# Patient Record
Sex: FEMALE | Race: WHITE | NOT HISPANIC OR LATINO
[De-identification: names, ages, dates, MRNs, and addresses within clinical notes are randomized per-mention and may not be internally consistent; named-entity substitution may affect disease eponyms.]

---

## 2018-01-11 ENCOUNTER — APPOINTMENT (OUTPATIENT)
Dept: OBGYN | Facility: CLINIC | Age: 58
End: 2018-01-11
Payer: COMMERCIAL

## 2018-01-11 VITALS
HEIGHT: 63.39 IN | BODY MASS INDEX: 24.74 KG/M2 | SYSTOLIC BLOOD PRESSURE: 100 MMHG | DIASTOLIC BLOOD PRESSURE: 60 MMHG | WEIGHT: 141.38 LBS

## 2018-01-11 DIAGNOSIS — Z80.8 FAMILY HISTORY OF MALIGNANT NEOPLASM OF OTHER ORGANS OR SYSTEMS: ICD-10-CM

## 2018-01-11 DIAGNOSIS — Z78.9 OTHER SPECIFIED HEALTH STATUS: ICD-10-CM

## 2018-01-11 DIAGNOSIS — Z82.49 FAMILY HISTORY OF ISCHEMIC HEART DISEASE AND OTHER DISEASES OF THE CIRCULATORY SYSTEM: ICD-10-CM

## 2018-01-11 PROCEDURE — 99203 OFFICE O/P NEW LOW 30 MIN: CPT

## 2018-01-12 ENCOUNTER — TRANSCRIPTION ENCOUNTER (OUTPATIENT)
Age: 58
End: 2018-01-12

## 2018-01-15 LAB — HPV HIGH+LOW RISK DNA PNL CVX: NOT DETECTED

## 2018-01-16 LAB — PAP TEST: NORMAL

## 2018-02-13 ENCOUNTER — APPOINTMENT (OUTPATIENT)
Dept: OBGYN | Facility: CLINIC | Age: 58
End: 2018-02-13

## 2018-02-21 ENCOUNTER — APPOINTMENT (OUTPATIENT)
Dept: GYNECOLOGIC ONCOLOGY | Facility: CLINIC | Age: 58
End: 2018-02-21
Payer: COMMERCIAL

## 2018-02-21 VITALS
BODY MASS INDEX: 24.15 KG/M2 | DIASTOLIC BLOOD PRESSURE: 67 MMHG | SYSTOLIC BLOOD PRESSURE: 114 MMHG | WEIGHT: 138 LBS | HEART RATE: 80 BPM | OXYGEN SATURATION: 97 %

## 2018-02-21 DIAGNOSIS — N95.0 POSTMENOPAUSAL BLEEDING: ICD-10-CM

## 2018-02-21 DIAGNOSIS — K59.00 CONSTIPATION, UNSPECIFIED: ICD-10-CM

## 2018-02-21 PROCEDURE — 99205 OFFICE O/P NEW HI 60 MIN: CPT

## 2018-02-22 PROBLEM — K59.00 CONSTIPATION: Status: ACTIVE | Noted: 2018-01-11

## 2018-02-22 PROBLEM — N95.0 POST-MENOPAUSAL BLEEDING: Status: ACTIVE | Noted: 2018-01-11

## 2018-07-23 PROBLEM — Z80.8 FAMILY HISTORY OF SKIN CANCER: Status: ACTIVE | Noted: 2018-01-11

## 2019-12-06 ENCOUNTER — TRANSCRIPTION ENCOUNTER (OUTPATIENT)
Age: 59
End: 2019-12-06

## 2019-12-13 ENCOUNTER — LABORATORY RESULT (OUTPATIENT)
Age: 59
End: 2019-12-13

## 2019-12-13 ENCOUNTER — APPOINTMENT (OUTPATIENT)
Dept: GYNECOLOGIC ONCOLOGY | Facility: CLINIC | Age: 59
End: 2019-12-13
Payer: COMMERCIAL

## 2019-12-13 VITALS
HEIGHT: 63 IN | DIASTOLIC BLOOD PRESSURE: 86 MMHG | SYSTOLIC BLOOD PRESSURE: 130 MMHG | WEIGHT: 151 LBS | BODY MASS INDEX: 26.75 KG/M2

## 2019-12-13 PROCEDURE — 99396 PREV VISIT EST AGE 40-64: CPT

## 2019-12-13 NOTE — DISCUSSION/SUMMARY
[Discuss Alternatives/Risks/Benefits w/Patient] : All alternatives, risks, and benefits were discussed with the patient/family and all questions were answered.  Patient expressed good understanding and appreciates the importance of follow up as recommended. [Reviewed Clinical Lab Test(s)] : Results of clinical tests were reviewed. [Reviewed Radiology Report(s)] : Radiology reports were reviewed. [FreeTextEntry2] : Small endometrial polyps, asymptomatic in a postmenopausal female:  likely benign [Pre Op] : The differential diagnosis was discussed in detail. The indications, risks, benefits and alternatives were discussed. [unfilled] expressed an understanding of the treatment rationale and her questions were answered to her apparent satisfaction. [FreeTextEntry1] : Endometrial Polyps w/ no abnormal vaginal bleeding \par -repeat her TVUS \par -If unchanged and asymptomatic: continue to monitor\par -If unchanged and symptomatic or larger: D&C hysteroscopy polypectomy\par \par Breast Tenderness\par -Diagnostic Mammogram BL and BL breast US ordered\par -If negative imaging but tenderness persists will refer to Breast clinic \par -Monthly breast exams\par \par Screening\par -Cotesting performed today \par \par Pt notified that she will be notified of results

## 2019-12-13 NOTE — PHYSICAL EXAM
[Normal] : Recto-Vaginal Exam: Normal [de-identified] : Normal Symmetry BL, No palpable mass BL, No lymphadenopathy BL [de-identified] : Non tender [Fully active, able to carry on all pre-disease performance without restriction] : Status 0 - Fully active, able to carry on all pre-disease performance without restriction [de-identified] : No adnexal fullness or palpable mass BL

## 2019-12-13 NOTE — HISTORY OF PRESENT ILLNESS
[FreeTextEntry1] : Problem\par 1)Thickened endometrium\par 2)PMB\par \par Previous Therapy\par 1)US 11/28/17\par    a)Endometrium 8.6mm with two nodular structures, largest 1.0x0.5x1.2cm suspicious for polyps \par    b)R ovary with simple 15mm or 5mm (typo in radiology report)\par 2)Pelvic US 1/10/2018\par    a)Endometrium with two soft tissue nodules, largest 0.8x0.2x0.8cm \par    b)Right ovary with simple 10mm cyst, has decreased in size since 11/2017\par 3)Pelvic US 12/5/2018\par    a) Fluid in the endometrium, single layer endometrial thickness in that area is 1mm\par    b) Right fundal echogenic nodule measuring 0.5 x 0.3 x 0.5 cm, stable and c/w polyp. In the right anterior right endometrium, there is a 0.6 x0.2 x 0.5 cm nodule , stable and c/w polyp \par    c) singe cyst measuring 4mm\par

## 2019-12-13 NOTE — REASON FOR VISIT
[FreeTextEntry1] : 58 y/o here for annual exam. Pt had h/o of PMPB and thickened endometrium  in 2018 and failed EMB. Pelvic sonogram from 1/2018 - 12/5/2018 showed stable polpys and the decision was made to continue to monitor. Pt reports no vaginal bleeding since last visit. Ms. Pena is complaining of Bilateral breast tenderness x 1 month, she is due for mammogram and breast US for h/o dense breast.

## 2019-12-13 NOTE — REVIEW OF SYSTEMS
[Negative] : Musculoskeletal [Abn Vag Bleeding] : no abnormal vaginal bleeding [de-identified] : + Breast tenderness BL x 1 month  [FreeTextEntry4] : Denies any postmenopausal bleeding

## 2019-12-23 DIAGNOSIS — N83.292 OTHER OVARIAN CYST, LEFT SIDE: ICD-10-CM

## 2019-12-23 LAB
A VAGINAE DNA VAG QL NAA+PROBE: NORMAL
BVAB2 DNA VAG QL NAA+PROBE: NORMAL
C KRUSEI DNA VAG QL NAA+PROBE: NEGATIVE
C TRACH RRNA SPEC QL NAA+PROBE: NEGATIVE
HPV HIGH+LOW RISK DNA PNL CVX: DETECTED
MEGA1 DNA VAG QL NAA+PROBE: NORMAL
N GONORRHOEA RRNA SPEC QL NAA+PROBE: NEGATIVE
T VAGINALIS RRNA SPEC QL NAA+PROBE: NEGATIVE

## 2020-01-08 ENCOUNTER — RESULT REVIEW (OUTPATIENT)
Age: 60
End: 2020-01-08

## 2020-01-09 ENCOUNTER — APPOINTMENT (OUTPATIENT)
Dept: SURGERY | Facility: CLINIC | Age: 60
End: 2020-01-09
Payer: COMMERCIAL

## 2020-01-09 VITALS
DIASTOLIC BLOOD PRESSURE: 84 MMHG | TEMPERATURE: 97.6 F | WEIGHT: 151 LBS | SYSTOLIC BLOOD PRESSURE: 114 MMHG | HEART RATE: 100 BPM | BODY MASS INDEX: 26.75 KG/M2 | OXYGEN SATURATION: 98 % | HEIGHT: 63 IN

## 2020-01-09 DIAGNOSIS — Z78.9 OTHER SPECIFIED HEALTH STATUS: ICD-10-CM

## 2020-01-09 PROCEDURE — 99203 OFFICE O/P NEW LOW 30 MIN: CPT

## 2020-01-10 LAB — CANCER AG125 SERPL-ACNC: 7 U/ML

## 2020-01-10 NOTE — HISTORY OF PRESENT ILLNESS
[FreeTextEntry1] : Shirley is a 60 yo female, referred by Tana THAYER) for left breast mastalgia, had been ongoing for the past 6-months. She reports a resolving rash on her chest after taking care of a friends cat (she is allergic), she took Benadryl for 4 days and the rash has almost completely resolved.

## 2020-01-10 NOTE — DATA REVIEWED
[FreeTextEntry1] : --12/19/19 ([facility]) b/l mammogram & US: scattered fibroglandular density. Left breast 12n1 there is a 0.4 cm simple cyst. BI-RADS 2.

## 2020-01-10 NOTE — PAST MEDICAL HISTORY
[Menarche Age ____] : age at menarche was [unfilled] [Postmenopausal] : The patient is postmenopausal [Menopause Age____] : age at menopause was [unfilled] [Approximately ___] : the LMP was approximately [unfilled] [Total Preg ___] : G[unfilled] [History of Hormone Replacement Treatment] : has no history of hormone replacement treatment [FreeTextEntry5] : Uterine polyp removed - 2008 [FreeTextEntry6] : None [FreeTextEntry8] : None [FreeTextEntry7] : Never been on

## 2020-03-10 DIAGNOSIS — N83.291 OTHER OVARIAN CYST, RIGHT SIDE: ICD-10-CM

## 2020-11-06 ENCOUNTER — NON-APPOINTMENT (OUTPATIENT)
Age: 60
End: 2020-11-06

## 2020-11-09 DIAGNOSIS — Z12.31 ENCOUNTER FOR SCREENING MAMMOGRAM FOR MALIGNANT NEOPLASM OF BREAST: ICD-10-CM

## 2020-12-23 PROBLEM — Z12.31 ENCOUNTER FOR SCREENING MAMMOGRAM FOR MALIGNANT NEOPLASM OF BREAST: Status: RESOLVED | Noted: 2020-11-09 | Resolved: 2020-12-23

## 2021-04-22 ENCOUNTER — APPOINTMENT (OUTPATIENT)
Dept: GYNECOLOGIC ONCOLOGY | Facility: CLINIC | Age: 61
End: 2021-04-22

## 2021-05-20 ENCOUNTER — NON-APPOINTMENT (OUTPATIENT)
Age: 61
End: 2021-05-20

## 2021-05-20 ENCOUNTER — APPOINTMENT (OUTPATIENT)
Dept: GYNECOLOGIC ONCOLOGY | Facility: CLINIC | Age: 61
End: 2021-05-20
Payer: COMMERCIAL

## 2021-05-20 VITALS
DIASTOLIC BLOOD PRESSURE: 70 MMHG | TEMPERATURE: 97.7 F | SYSTOLIC BLOOD PRESSURE: 115 MMHG | WEIGHT: 135 LBS | HEIGHT: 63 IN | OXYGEN SATURATION: 95 % | BODY MASS INDEX: 23.92 KG/M2 | HEART RATE: 116 BPM

## 2021-05-20 DIAGNOSIS — Z12.11 ENCOUNTER FOR SCREENING FOR MALIGNANT NEOPLASM OF COLON: ICD-10-CM

## 2021-05-20 DIAGNOSIS — Z12.31 ENCOUNTER FOR SCREENING MAMMOGRAM FOR MALIGNANT NEOPLASM OF BREAST: ICD-10-CM

## 2021-05-20 PROCEDURE — 99396 PREV VISIT EST AGE 40-64: CPT | Mod: 25

## 2021-05-20 NOTE — PHYSICAL EXAM
[Normal] : Recto-Vaginal Exam: Normal [Fully active, able to carry on all pre-disease performance without restriction] : Status 0 - Fully active, able to carry on all pre-disease performance without restriction [de-identified] : soft, non tender [de-identified] : Normal Symmetry BL, No palpable mass BL, No lymphadenopathy BL [de-identified] : Non tender [de-identified] : No adnexal fullness or palpable mass BL

## 2021-05-20 NOTE — DISCUSSION/SUMMARY
[Reviewed Clinical Lab Test(s)] : Results of clinical tests were reviewed. [Reviewed Radiology Report(s)] : Radiology reports were reviewed. [Discuss Alternatives/Risks/Benefits w/Patient] : All alternatives, risks, and benefits were discussed with the patient/family and all questions were answered.  Patient expressed good understanding and appreciates the importance of follow up as recommended. [Pre Op] : The differential diagnosis was discussed in detail. The indications, risks, benefits and alternatives were discussed. [unfilled] expressed an understanding of the treatment rationale and her questions were answered to her apparent satisfaction. [FreeTextEntry2] : Small endometrial polyps, asymptomatic in a postmenopausal female:  likely benign [FreeTextEntry1] : Endometrial Polyps w/ no abnormal vaginal bleeding \par -repeat her TVUS \par -If unchanged and asymptomatic: continue to monitor\par -If unchanged and symptomatic or larger: D&C hysteroscopy polypectomy\par \par Breast Tenderness\par - Mammogram BL and BL breast US ordered\par - Pt has follow up with Dr. Tripp on 6/8\par \par Screening\par -Cotesting performed today \par - Colonscopy referral \par \par Pt notified that she will be notified of results

## 2021-05-20 NOTE — REVIEW OF SYSTEMS
[Negative] : Musculoskeletal [Abn Vag Bleeding] : no abnormal vaginal bleeding [de-identified] : + Breast tenderness to left breast  [FreeTextEntry4] : Denies any postmenopausal bleeding

## 2021-05-20 NOTE — REASON FOR VISIT
[FreeTextEntry1] : 59 y/o here for annual exam, with no acute complainst today. \par \par Pt had h/o of PMPB and thickened endometrium  in 2018 and failed EMB. Pelvic sonogram from 1/2018 - 12/5/2018 showed stable polpys and the decision was made to continue to monitor. Pt reports no vaginal bleeding since last visit. \par \par Patient is being followed by Dr. Tripp for breast tenderness. \par \par Last pap smear: 12/13/2019- neg, hpv positive -- repeat cotesting needed\par Last mammogram: 12/19/2019 - normal \par Last colonoscopy- many years ago as per patient and states h/o polyps in her 40's

## 2021-05-21 LAB — HPV HIGH+LOW RISK DNA PNL CVX: NOT DETECTED

## 2021-06-01 ENCOUNTER — TRANSCRIPTION ENCOUNTER (OUTPATIENT)
Age: 61
End: 2021-06-01

## 2021-06-08 ENCOUNTER — APPOINTMENT (OUTPATIENT)
Dept: BREAST CENTER | Facility: CLINIC | Age: 61
End: 2021-06-08
Payer: COMMERCIAL

## 2021-06-08 VITALS
OXYGEN SATURATION: 99 % | WEIGHT: 137 LBS | BODY MASS INDEX: 24.27 KG/M2 | HEART RATE: 76 BPM | SYSTOLIC BLOOD PRESSURE: 116 MMHG | RESPIRATION RATE: 18 BRPM | HEIGHT: 63 IN | DIASTOLIC BLOOD PRESSURE: 73 MMHG

## 2021-06-08 PROCEDURE — 99212 OFFICE O/P EST SF 10 MIN: CPT

## 2021-07-30 ENCOUNTER — TRANSCRIPTION ENCOUNTER (OUTPATIENT)
Age: 61
End: 2021-07-30

## 2021-07-30 ENCOUNTER — APPOINTMENT (OUTPATIENT)
Dept: GASTROENTEROLOGY | Facility: CLINIC | Age: 61
End: 2021-07-30

## 2021-08-03 ENCOUNTER — NON-APPOINTMENT (OUTPATIENT)
Age: 61
End: 2021-08-03

## 2021-08-03 ENCOUNTER — APPOINTMENT (OUTPATIENT)
Dept: GASTROENTEROLOGY | Facility: CLINIC | Age: 61
End: 2021-08-03
Payer: COMMERCIAL

## 2021-08-03 VITALS
BODY MASS INDEX: 23.92 KG/M2 | HEART RATE: 94 BPM | DIASTOLIC BLOOD PRESSURE: 73 MMHG | TEMPERATURE: 97.8 F | SYSTOLIC BLOOD PRESSURE: 108 MMHG | OXYGEN SATURATION: 97 % | HEIGHT: 63 IN | WEIGHT: 135 LBS

## 2021-08-03 PROCEDURE — 99205 OFFICE O/P NEW HI 60 MIN: CPT

## 2021-08-04 NOTE — PHYSICAL EXAM
[General Appearance - Alert] : alert [General Appearance - In No Acute Distress] : in no acute distress [General Appearance - Well Nourished] : well nourished [Sclera] : the sclera and conjunctiva were normal [Outer Ear] : the ears and nose were normal in appearance [Neck Appearance] : the appearance of the neck was normal [Neck Cervical Mass (___cm)] : no neck mass was observed [] : no respiratory distress [Heart Rate And Rhythm] : heart rate was normal and rhythm regular [Edema] : there was no peripheral edema [Bowel Sounds] : normal bowel sounds [Abdomen Soft] : soft [Abdomen Tenderness] : non-tender [Abnormal Walk] : normal gait [Skin Color & Pigmentation] : normal skin color and pigmentation [No Focal Deficits] : no focal deficits [Oriented To Time, Place, And Person] : oriented to person, place, and time [Impaired Insight] : insight and judgment were intact [Affect] : the affect was normal

## 2021-08-05 NOTE — HISTORY OF PRESENT ILLNESS
[de-identified] : 60F with PMHx hypothyroidism (on Synthroid for 30 years) referred by PA, Tana Duke, for colon cancer screening. \par \par Pt reports last colonoscopy was at least 12 years ago - found to have benign polyps. actually woke up from one colonoscopy which was very traumatic \par in her 30s had a series of duodenal ulcers. many endoscopies. negative celiac. not able to find out what caused it. thinks due to weak peristalsis? antibiotic use? \par a lot of oral surgery with antibiotics when younger\par used to have GI symptoms - but last 10 years much better by conscious of what eats\par recently first time on antibiotics in 15 years. got a bad bite on arm and had a lot of redness so took doxycycline for 1 month\par - checked for lyme disease and one band, # 23, positive\par \par now taking acidophilous 3x per day\par No GI symptoms when on doxy  but kind of worn out from antibiotics and some constipation. still BM every day but notices some straining. hoping will improve\par \par FHX: no GI cancers, no autoimmune diseases\par Etoh: social\par Smoking: none\par Drug use: none\par \par avoid dairy for most part\par eats a lot of nuts, raisins, beans, grains \par spelt flour makes own food \par eats fish once a month

## 2021-08-05 NOTE — ASSESSMENT
[FreeTextEntry1] : 60F with PMHx hypothyroidism (on Synthroid for 30 years) referred by PA, Tana Duke, for colon cancer screening. \par \par Constipation post antibiotic use \par - reassurance provided that symptoms should heal with time and encouraged to incorporate pre/probiotics food \par - stop calcium supplementation for now as can cause constipation \par - start magnesium oxide 500mg at bedtime, psyllium husk/sunfiber \par \par Colon cancer screening \par - schedule colonoscopy for October/November 2021, r/a/i/b discussed and patient agreeable \par - recommended to contact office for earlier colonoscopy appointment with other GI doctor if constipation does not improve\par - bowel prep instructions to be provided \par \par f/u post colonoscopy and PRN

## 2021-08-05 NOTE — REASON FOR VISIT
[Initial Evaluation] : an initial evaluation [FreeTextEntry1] : colon cancer screening  Skin Substitute Injection Text: The defect edges were debeveled with a #15 scalpel blade.  Given the location of the defect, shape of the defect and the proximity to free margins a skin substitute micronized graft was deemed most appropriate.  The entire vial contents were admixed with 3.0ccs of sterile saline and then injected subcutaneously throughout the entire wound bed.

## 2021-09-17 ENCOUNTER — APPOINTMENT (OUTPATIENT)
Dept: INTERNAL MEDICINE | Facility: CLINIC | Age: 61
End: 2021-09-17

## 2021-11-05 ENCOUNTER — APPOINTMENT (OUTPATIENT)
Dept: INTERNAL MEDICINE | Facility: CLINIC | Age: 61
End: 2021-11-05

## 2021-12-08 ENCOUNTER — TRANSCRIPTION ENCOUNTER (OUTPATIENT)
Age: 61
End: 2021-12-08

## 2021-12-13 ENCOUNTER — APPOINTMENT (OUTPATIENT)
Dept: GASTROENTEROLOGY | Facility: CLINIC | Age: 61
End: 2021-12-13
Payer: COMMERCIAL

## 2021-12-13 PROCEDURE — 45378 DIAGNOSTIC COLONOSCOPY: CPT

## 2021-12-16 ENCOUNTER — NON-APPOINTMENT (OUTPATIENT)
Age: 61
End: 2021-12-16

## 2021-12-30 RX ORDER — LEVOTHYROXINE SODIUM 0.1 MG/1
100 TABLET ORAL DAILY
Qty: 60 | Refills: 0 | Status: COMPLETED | COMMUNITY
End: 2021-12-30

## 2022-01-13 ENCOUNTER — APPOINTMENT (OUTPATIENT)
Dept: INTERNAL MEDICINE | Facility: CLINIC | Age: 62
End: 2022-01-13

## 2022-03-02 ENCOUNTER — APPOINTMENT (OUTPATIENT)
Dept: INTERNAL MEDICINE | Facility: CLINIC | Age: 62
End: 2022-03-02
Payer: COMMERCIAL

## 2022-03-02 ENCOUNTER — NON-APPOINTMENT (OUTPATIENT)
Age: 62
End: 2022-03-02

## 2022-03-02 ENCOUNTER — LABORATORY RESULT (OUTPATIENT)
Age: 62
End: 2022-03-02

## 2022-03-02 VITALS
OXYGEN SATURATION: 96 % | HEART RATE: 100 BPM | TEMPERATURE: 97.8 F | WEIGHT: 133 LBS | DIASTOLIC BLOOD PRESSURE: 71 MMHG | BODY MASS INDEX: 23.57 KG/M2 | HEIGHT: 63 IN | SYSTOLIC BLOOD PRESSURE: 105 MMHG

## 2022-03-02 DIAGNOSIS — Z85.820 PERSONAL HISTORY OF MALIGNANT MELANOMA OF SKIN: ICD-10-CM

## 2022-03-02 DIAGNOSIS — Z82.49 FAMILY HISTORY OF ISCHEMIC HEART DISEASE AND OTHER DISEASES OF THE CIRCULATORY SYSTEM: ICD-10-CM

## 2022-03-02 DIAGNOSIS — Z13.1 ENCOUNTER FOR SCREENING FOR DIABETES MELLITUS: ICD-10-CM

## 2022-03-02 PROCEDURE — 99386 PREV VISIT NEW AGE 40-64: CPT | Mod: 25

## 2022-03-02 RX ORDER — MAGNESIUM OXIDE 500 MG
500 TABLET ORAL
Refills: 0 | Status: ACTIVE | COMMUNITY

## 2022-03-02 NOTE — HISTORY OF PRESENT ILLNESS
[FreeTextEntry1] : annual exam/est care [de-identified] : h/o melanoma\par - follows w/ derm\par - has had 2 lesions removed, 1 melanoma \par \par hypothyroidism\par - stable on Synthroid, needs brand\par \par h/o tick bite, 7/2021\par - w/ rash over left arm\par - started doxy x 4 weeks\par \par HCM\par - c scope up today \par - needs dexa\par - declines flu vax

## 2022-03-10 ENCOUNTER — NON-APPOINTMENT (OUTPATIENT)
Age: 62
End: 2022-03-10

## 2022-03-10 ENCOUNTER — TRANSCRIPTION ENCOUNTER (OUTPATIENT)
Age: 62
End: 2022-03-10

## 2022-03-10 ENCOUNTER — APPOINTMENT (OUTPATIENT)
Dept: OPHTHALMOLOGY | Facility: CLINIC | Age: 62
End: 2022-03-10
Payer: COMMERCIAL

## 2022-03-10 LAB
ALBUMIN SERPL ELPH-MCNC: 4.9 G/DL
ALP BLD-CCNC: 88 U/L
ALT SERPL-CCNC: 22 U/L
ANION GAP SERPL CALC-SCNC: 14 MMOL/L
APPEARANCE: ABNORMAL
AST SERPL-CCNC: 31 U/L
BASOPHILS # BLD AUTO: 0.04 K/UL
BASOPHILS NFR BLD AUTO: 0.7 %
BILIRUB SERPL-MCNC: 0.8 MG/DL
BILIRUBIN URINE: NEGATIVE
BLOOD URINE: NEGATIVE
BUN SERPL-MCNC: 13 MG/DL
CALCIUM SERPL-MCNC: 10.1 MG/DL
CHLORIDE SERPL-SCNC: 101 MMOL/L
CHOLEST SERPL-MCNC: 277 MG/DL
CO2 SERPL-SCNC: 25 MMOL/L
COLOR: ABNORMAL
CREAT SERPL-MCNC: 0.72 MG/DL
EGFR: 96 ML/MIN/1.73M2
EOSINOPHIL # BLD AUTO: 0.07 K/UL
EOSINOPHIL NFR BLD AUTO: 1.2 %
ESTIMATED AVERAGE GLUCOSE: 103 MG/DL
GLUCOSE QUALITATIVE U: NEGATIVE
GLUCOSE SERPL-MCNC: 77 MG/DL
HBA1C MFR BLD HPLC: 5.2 %
HCT VFR BLD CALC: 40.3 %
HDLC SERPL-MCNC: 81 MG/DL
HGB BLD-MCNC: 13.2 G/DL
IMM GRANULOCYTES NFR BLD AUTO: 0.2 %
KETONES URINE: ABNORMAL
LDLC SERPL CALC-MCNC: 178 MG/DL
LEUKOCYTE ESTERASE URINE: NEGATIVE
LYMPHOCYTES # BLD AUTO: 2.18 K/UL
LYMPHOCYTES NFR BLD AUTO: 36.6 %
MAN DIFF?: NORMAL
MCHC RBC-ENTMCNC: 29.5 PG
MCHC RBC-ENTMCNC: 32.8 GM/DL
MCV RBC AUTO: 90 FL
MONOCYTES # BLD AUTO: 0.39 K/UL
MONOCYTES NFR BLD AUTO: 6.6 %
NEUTROPHILS # BLD AUTO: 3.26 K/UL
NEUTROPHILS NFR BLD AUTO: 54.7 %
NITRITE URINE: NEGATIVE
NONHDLC SERPL-MCNC: 195 MG/DL
PH URINE: 5.5
PLATELET # BLD AUTO: 222 K/UL
POTASSIUM SERPL-SCNC: 4.3 MMOL/L
PROT SERPL-MCNC: 7.8 G/DL
PROTEIN URINE: ABNORMAL
RBC # BLD: 4.48 M/UL
RBC # FLD: 13.3 %
SODIUM SERPL-SCNC: 140 MMOL/L
SPECIFIC GRAVITY URINE: 1.03
TRIGL SERPL-MCNC: 86 MG/DL
TSH SERPL-ACNC: 1.82 UIU/ML
UROBILINOGEN URINE: ABNORMAL
WBC # FLD AUTO: 5.95 K/UL

## 2022-03-10 PROCEDURE — 92004 COMPRE OPH EXAM NEW PT 1/>: CPT

## 2022-03-10 PROCEDURE — 92134 CPTRZ OPH DX IMG PST SGM RTA: CPT

## 2022-03-11 ENCOUNTER — NON-APPOINTMENT (OUTPATIENT)
Age: 62
End: 2022-03-11

## 2022-03-11 ENCOUNTER — APPOINTMENT (OUTPATIENT)
Dept: HEART AND VASCULAR | Facility: CLINIC | Age: 62
End: 2022-03-11
Payer: COMMERCIAL

## 2022-03-11 VITALS
BODY MASS INDEX: 23.57 KG/M2 | WEIGHT: 133 LBS | OXYGEN SATURATION: 98 % | DIASTOLIC BLOOD PRESSURE: 71 MMHG | TEMPERATURE: 97.8 F | HEART RATE: 70 BPM | HEIGHT: 63 IN | SYSTOLIC BLOOD PRESSURE: 113 MMHG

## 2022-03-11 PROCEDURE — 36415 COLL VENOUS BLD VENIPUNCTURE: CPT

## 2022-03-11 PROCEDURE — 93000 ELECTROCARDIOGRAM COMPLETE: CPT

## 2022-03-11 PROCEDURE — 99204 OFFICE O/P NEW MOD 45 MIN: CPT | Mod: 25

## 2022-03-12 NOTE — PHYSICAL EXAM
[Normal Conjunctiva] : normal conjunctiva [Normal Venous Pressure] : normal venous pressure [No Carotid Bruit] : no carotid bruit [Normal S1, S2] : normal S1, S2 [No Murmur] : no murmur [No Rub] : no rub [Normal] : soft, non-tender, no masses/organomegaly, normal bowel sounds [Normal Gait] : normal gait [No Edema] : no edema [Moves all extremities] : moves all extremities [Alert and Oriented] : alert and oriented

## 2022-03-12 NOTE — HISTORY OF PRESENT ILLNESS
[FreeTextEntry1] : Ms. Pena is a 61 year old woman with HL (declines statins), hx of melanoma, hypothyroidism who is referred for palpitations. \par \par Last saw cardiologist 4-5 years ago, had a stress test, Holter -- everything was fine\par \par Palpitations:\par -heart pounding when trying to go to sleep\par -thinks related to coffee -- if not drinking coffee, thinks can fall asleep without the pounding sensation\par \par Reports dyspnea on exertion \par No chest pain\par When walking 6-7 miles, has leg heaviness -- used to be better with swimming\par She is concerned because of her family history \par \par HL:\par -diagnosed in her late teens\par -diagnosed with hypothyroidism in her late 20s -- felt much better after treatment\par -has been resistant to statins\par -reports LDL has been variable over the years but elevated on most recent labs\par \par Recent labs:\par Chol 277\par Trig 86\par \par HDL 81\par TSH 1.82\par Cr 0.72\par AST/ALT \par A1c 5.2%\par \par PMH/PSH:\par as above; also\par arthroscopy\par oral surgeries\par \par FH\par father  at age 60 of [place]\par \par SH\par no tob\par occ etoh\par \par \par ALL\par sulfa\par milk\par \par ROS\par no fever/chills\par no nausea/vomiting\par \par Lifestyle History:\par Mediterranean Diet Score (9 question survey) was 6-7\par (8-9: optimal, 6-7: near-optimal, 4-5: suboptimal, 0-3: markedly suboptimal)\par Exercise: Patient reports exercising at a moderate level for 30-60 minutes per week. \par Smoking: Never smoker\par Denies snoring, rare gasping episodes in sleep, no excessive daytime fatigue\par \par No PCOS\par No pregnancies\par No miscarriages\par Menopause age 54 no HRT

## 2022-03-12 NOTE — DISCUSSION/SUMMARY
[FreeTextEntry1] : Ms. Pena is a 61 year old woman with HL (declines statins), FH of CAD, hx of melanoma, hypothyroidism who is referred for palpitations and c/o dyspnea on exertion.\par \par Palpitations\par -discussed likely related to caffeine but since thinks symptoms are getting worse, will check ambulatory monitor\par -check TTE \par \par Dyspnea on exertion:\par -check TTE\par -given FH and risk factor of long-standing HL, check CCTA (HR on EKG 50s so will hold off on metop premed)\par \par HL:\par -discussed statin initiation however she is hesitant -- wants to wait for CCTA results\par -already following near-optimal diet\par -given FH of early CAD, check lipoprotein (a) -- pt agreeable\par \par Follow-up after above testing\par

## 2022-03-15 LAB — APO LP(A) SERPL-MCNC: <9 NMOL/L

## 2022-03-28 ENCOUNTER — NON-APPOINTMENT (OUTPATIENT)
Age: 62
End: 2022-03-28

## 2022-04-06 ENCOUNTER — APPOINTMENT (OUTPATIENT)
Dept: HEART AND VASCULAR | Facility: CLINIC | Age: 62
End: 2022-04-06

## 2022-07-14 ENCOUNTER — OUTPATIENT (OUTPATIENT)
Dept: OUTPATIENT SERVICES | Facility: HOSPITAL | Age: 62
LOS: 1 days | End: 2022-07-14

## 2022-07-14 ENCOUNTER — APPOINTMENT (OUTPATIENT)
Dept: CT IMAGING | Facility: CLINIC | Age: 62
End: 2022-07-14

## 2022-07-14 ENCOUNTER — RESULT REVIEW (OUTPATIENT)
Age: 62
End: 2022-07-14

## 2022-07-14 PROCEDURE — 75574 CT ANGIO HRT W/3D IMAGE: CPT | Mod: 26

## 2022-07-15 ENCOUNTER — RESULT REVIEW (OUTPATIENT)
Age: 62
End: 2022-07-15

## 2022-07-15 ENCOUNTER — APPOINTMENT (OUTPATIENT)
Dept: ULTRASOUND IMAGING | Facility: CLINIC | Age: 62
End: 2022-07-15

## 2022-07-15 ENCOUNTER — APPOINTMENT (OUTPATIENT)
Dept: MAMMOGRAPHY | Facility: CLINIC | Age: 62
End: 2022-07-15

## 2022-07-15 PROCEDURE — 76856 US EXAM PELVIC COMPLETE: CPT

## 2022-07-15 PROCEDURE — 76830 TRANSVAGINAL US NON-OB: CPT

## 2022-07-15 PROCEDURE — 77067 SCR MAMMO BI INCL CAD: CPT

## 2022-07-15 PROCEDURE — 77063 BREAST TOMOSYNTHESIS BI: CPT

## 2022-07-18 DIAGNOSIS — R06.00 DYSPNEA, UNSPECIFIED: ICD-10-CM

## 2022-07-21 ENCOUNTER — APPOINTMENT (OUTPATIENT)
Dept: GYNECOLOGIC ONCOLOGY | Facility: CLINIC | Age: 62
End: 2022-07-21

## 2022-07-21 VITALS
TEMPERATURE: 97.3 F | HEIGHT: 63 IN | SYSTOLIC BLOOD PRESSURE: 110 MMHG | HEART RATE: 108 BPM | WEIGHT: 131 LBS | DIASTOLIC BLOOD PRESSURE: 64 MMHG | BODY MASS INDEX: 23.21 KG/M2 | OXYGEN SATURATION: 98 %

## 2022-07-21 PROCEDURE — 99396 PREV VISIT EST AGE 40-64: CPT

## 2022-07-21 NOTE — HISTORY OF PRESENT ILLNESS
[FreeTextEntry1] : Problem\par 1)Thickened endometrium\par 2)PMB\par \par Previous Therapy\par 1)US 11/28/17\par    a)Endometrium 8.6mm with two nodular structures, largest 1.0x0.5x1.2cm suspicious for polyps \par    b)R ovary with simple 15mm or 5mm (typo in radiology report)\par 2)Pelvic US 1/10/2018\par    a)Endometrium with two soft tissue nodules, largest 0.8x0.2x0.8cm \par    b)Right ovary with simple 10mm cyst, has decreased in size since 11/2017\par 3)Pelvic US 12/5/2018\par    a) Fluid in the endometrium, single layer endometrial thickness in that area is 1mm\par    b) Right fundal echogenic nodule measuring 0.5 x 0.3 x 0.5 cm, stable and c/w polyp. In the right anterior right endometrium, there is a 0.6 x0.2 x 0.5 cm nodule , stable and c/w polyp \par    c) singe cyst measuring 4mm\par 4) Pelvic US 11/2021- two endometrial polyps- measuring 0.9cm and 0.5cm \par 5) Pelvic US 7/022\par    a)  stable endometrial polyps 0.8cm and 0.3cm. Probable endometrial adhesion.Normal ovaries.\par

## 2022-07-21 NOTE — DISCUSSION/SUMMARY
[Reviewed Clinical Lab Test(s)] : Results of clinical tests were reviewed. [Reviewed Radiology Report(s)] : Radiology reports were reviewed. [Discuss Alternatives/Risks/Benefits w/Patient] : All alternatives, risks, and benefits were discussed with the patient/family and all questions were answered.  Patient expressed good understanding and appreciates the importance of follow up as recommended. [Pre Op] : The differential diagnosis was discussed in detail. The indications, risks, benefits and alternatives were discussed. [unfilled] expressed an understanding of the treatment rationale and her questions were answered to her apparent satisfaction. [FreeTextEntry2] : Small endometrial polyps, asymptomatic in a postmenopausal female:  likely benign [FreeTextEntry1] : - Unremarkbale GYN exam\par - Stable/slight decreased endometrial polyps\par - Repeat sonogram in 6 months as per patient's request\par - Follow up in office in 1 year or prn. Pt encouraged to come in sooner if she experiences any postmenopausal bleeding. Pt verbalized understanding.

## 2022-07-21 NOTE — REVIEW OF SYSTEMS
[Negative] : Musculoskeletal [Abn Vag Bleeding] : no abnormal vaginal bleeding [de-identified] : + Breast tenderness to left breast  [FreeTextEntry4] : Denies any postmenopausal bleeding

## 2022-07-21 NOTE — REASON FOR VISIT
[FreeTextEntry1] : 60 y/o here for annual exam, with no acute complaints today. \par \par Pt had h/o of PMPB and thickened endometrium  in 2018 and failed EMB. Pelvic sonogram from 1/2018 - 12/5/2018 showed stable polpys and the decision was made to continue to monitor. Pt reports no vaginal bleeding since last visit. \par \par Pt reports previous breast tenderness has resolved. \par \par Last pap smear: 5/2021- neg, HPV neg \par Last mammogram:  7/2022- [facility]IR[facility] 1- negative \par Last colonoscopy- many years ago as per patient and states h/o polyps in her 40's

## 2022-07-21 NOTE — PHYSICAL EXAM
[Normal] : Recto-Vaginal Exam: Normal [Fully active, able to carry on all pre-disease performance without restriction] : Status 0 - Fully active, able to carry on all pre-disease performance without restriction [de-identified] : soft, non tender [de-identified] : Normal Symmetry BL, No palpable mass BL, No lymphadenopathy BL [de-identified] : Non tender [de-identified] : No adnexal fullness or palpable mass BL

## 2022-07-27 ENCOUNTER — APPOINTMENT (OUTPATIENT)
Dept: HEART AND VASCULAR | Facility: CLINIC | Age: 62
End: 2022-07-27

## 2022-07-27 PROCEDURE — 99443: CPT

## 2022-07-28 RX ORDER — OFLOXACIN 3 MG/ML
0.3 SOLUTION/ DROPS OPHTHALMIC
Qty: 5 | Refills: 0 | Status: COMPLETED | COMMUNITY
Start: 2022-04-12

## 2022-07-28 RX ORDER — ERYTHROMYCIN 5 MG/G
5 OINTMENT OPHTHALMIC
Qty: 4 | Refills: 0 | Status: COMPLETED | COMMUNITY
Start: 2022-04-12

## 2022-07-29 ENCOUNTER — APPOINTMENT (OUTPATIENT)
Dept: HEART AND VASCULAR | Facility: CLINIC | Age: 62
End: 2022-07-29

## 2022-07-29 VITALS
HEART RATE: 91 BPM | WEIGHT: 131 LBS | DIASTOLIC BLOOD PRESSURE: 76 MMHG | OXYGEN SATURATION: 95 % | SYSTOLIC BLOOD PRESSURE: 113 MMHG | BODY MASS INDEX: 23.21 KG/M2 | HEIGHT: 63 IN

## 2022-07-29 PROCEDURE — 93306 TTE W/DOPPLER COMPLETE: CPT

## 2022-08-01 ENCOUNTER — NON-APPOINTMENT (OUTPATIENT)
Age: 62
End: 2022-08-01

## 2022-09-23 DIAGNOSIS — Z23 ENCOUNTER FOR IMMUNIZATION: ICD-10-CM

## 2022-09-26 ENCOUNTER — TRANSCRIPTION ENCOUNTER (OUTPATIENT)
Age: 62
End: 2022-09-26

## 2022-11-08 ENCOUNTER — APPOINTMENT (OUTPATIENT)
Dept: ULTRASOUND IMAGING | Facility: CLINIC | Age: 62
End: 2022-11-08

## 2023-03-20 ENCOUNTER — RX RENEWAL (OUTPATIENT)
Age: 63
End: 2023-03-20

## 2023-04-02 ENCOUNTER — RESULT CHARGE (OUTPATIENT)
Age: 63
End: 2023-04-02

## 2023-04-03 ENCOUNTER — APPOINTMENT (OUTPATIENT)
Dept: INTERNAL MEDICINE | Facility: CLINIC | Age: 63
End: 2023-04-03
Payer: COMMERCIAL

## 2023-04-03 ENCOUNTER — NON-APPOINTMENT (OUTPATIENT)
Age: 63
End: 2023-04-03

## 2023-04-03 ENCOUNTER — LABORATORY RESULT (OUTPATIENT)
Age: 63
End: 2023-04-03

## 2023-04-03 VITALS
HEART RATE: 80 BPM | WEIGHT: 129 LBS | RESPIRATION RATE: 18 BRPM | TEMPERATURE: 98.3 F | HEIGHT: 63 IN | BODY MASS INDEX: 22.86 KG/M2 | DIASTOLIC BLOOD PRESSURE: 69 MMHG | SYSTOLIC BLOOD PRESSURE: 112 MMHG | OXYGEN SATURATION: 99 %

## 2023-04-03 DIAGNOSIS — R00.2 PALPITATIONS: ICD-10-CM

## 2023-04-03 DIAGNOSIS — Z00.00 ENCOUNTER FOR GENERAL ADULT MEDICAL EXAMINATION W/OUT ABNORMAL FINDINGS: ICD-10-CM

## 2023-04-03 DIAGNOSIS — Z53.20 PROCEDURE AND TREATMENT NOT CARRIED OUT BECAUSE OF PATIENT'S DECISION FOR UNSPECIFIED REASONS: ICD-10-CM

## 2023-04-03 DIAGNOSIS — U07.1 COVID-19: ICD-10-CM

## 2023-04-03 DIAGNOSIS — M85.80 OTHER SPECIFIED DISORDERS OF BONE DENSITY AND STRUCTURE, UNSPECIFIED SITE: ICD-10-CM

## 2023-04-03 DIAGNOSIS — N64.4 MASTODYNIA: ICD-10-CM
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
## 2023-04-03 DIAGNOSIS — R25.2 CRAMP AND SPASM: ICD-10-CM

## 2023-04-03 DIAGNOSIS — R33.9 RETENTION OF URINE, UNSPECIFIED: ICD-10-CM

## 2023-04-03 DIAGNOSIS — J10.1 INFLUENZA DUE TO OTHER IDENTIFIED INFLUENZA VIRUS WITH OTHER RESPIRATORY MANIFESTATIONS: ICD-10-CM

## 2023-04-03 PROCEDURE — 93000 ELECTROCARDIOGRAM COMPLETE: CPT

## 2023-04-03 PROCEDURE — 99396 PREV VISIT EST AGE 40-64: CPT | Mod: 25

## 2023-04-03 PROCEDURE — 99214 OFFICE O/P EST MOD 30 MIN: CPT | Mod: 25

## 2023-04-03 RX ORDER — METOPROLOL SUCCINATE 50 MG/1
50 TABLET, EXTENDED RELEASE ORAL
Qty: 2 | Refills: 0 | Status: COMPLETED | COMMUNITY
Start: 2022-05-19 | End: 2023-04-03

## 2023-04-03 NOTE — HISTORY OF PRESENT ILLNESS
[FreeTextEntry1] : annual physical [de-identified] : Pt is a 61 y/o F with PMHx of palpitations, osteopenia who presents to the office today for annual physical\par \par L breast pain:\par - Intense pain, dull, when she palpates it sharp.  can radiates. ? nipple changes,  Occurring 3 months\par - Had previously saw Dr. Way - breast specialist, because of abnormal finding on mammogram.  Last visit May 2020\par - has become more frequent recently \par \par ENT:\par - is interested in referral because of History of radiation due to multiple oral surgeries \par - need referral\par \par HCM\par - Covid vaccine: bivalent UTD\par - Influenza vaccine: did not get vaccine\par - Shingrix vaccine: Needs \par - Colonoscopy: due 2026 years- suboptimal prep\par - Mammo: 7/22 WNL\par - Pap: 5/21 WNL\par - DEXA: osteopenia 3/22\par - Ophthalmology: UTD\par - [place]  [place]

## 2023-04-03 NOTE — HEALTH RISK ASSESSMENT
[Patient reported mammogram was normal] : Patient reported mammogram was normal [Patient reported PAP Smear was normal] : Patient reported PAP Smear was normal [Patient reported bone density results were abnormal] : Patient reported bone density results were abnormal [HIV test declined] : HIV test declined [Hepatitis C test declined] : Hepatitis C test declined [No falls in past year] : Patient reported no falls in the past year [0] : 2) Feeling down, depressed, or hopeless: Not at all (0) [PHQ-2 Negative - No further assessment needed] : PHQ-2 Negative - No further assessment needed [Patient reported colonoscopy was normal] : Patient reported colonoscopy was normal [Never] : Never [[id number]] : 0 [Change in mental status noted] : No change in mental status noted [MammogramDate] : 7/2022 [PapSmearDate] : 5/21 [BoneDensityDate] : 3/22 [BoneDensityComments] : Osteopenia [ColonoscopyDate] : 12/21 [ColonoscopyComments] : due 2026 years- suboptimal prep

## 2023-04-03 NOTE — PHYSICAL EXAM
[No Acute Distress] : no acute distress [No Edema] : there was no peripheral edema [No CVA Tenderness] : no CVA  tenderness [No Rash] : no rash [Normal Appearance] : normal in appearance [No Masses] : no palpable masses [No Nipple Discharge] : no nipple discharge [No Axillary Lymphadenopathy] : no axillary lymphadenopathy [Normal] : no posterior cervical lymphadenopathy and no anterior cervical lymphadenopathy [de-identified] : tenderness L breast 10 O'clock position 3 cm from nipple

## 2023-04-04 DIAGNOSIS — N39.0 URINARY TRACT INFECTION, SITE NOT SPECIFIED: ICD-10-CM

## 2023-04-04 LAB
ALBUMIN SERPL ELPH-MCNC: 4.6 G/DL
ALP BLD-CCNC: 69 U/L
ALT SERPL-CCNC: 16 U/L
ANION GAP SERPL CALC-SCNC: 12 MMOL/L
APPEARANCE: ABNORMAL
AST SERPL-CCNC: 27 U/L
BASOPHILS # BLD AUTO: 0.04 K/UL
BASOPHILS NFR BLD AUTO: 0.6 %
BILIRUB SERPL-MCNC: 0.6 MG/DL
BILIRUBIN URINE: NEGATIVE
BLOOD URINE: NEGATIVE
BUN SERPL-MCNC: 9 MG/DL
CALCIUM SERPL-MCNC: 9.9 MG/DL
CHLORIDE SERPL-SCNC: 102 MMOL/L
CHOLEST SERPL-MCNC: 231 MG/DL
CO2 SERPL-SCNC: 27 MMOL/L
COLOR: YELLOW
CREAT SERPL-MCNC: 0.61 MG/DL
EGFR: 102 ML/MIN/1.73M2
EOSINOPHIL # BLD AUTO: 0.07 K/UL
EOSINOPHIL NFR BLD AUTO: 1.1 %
ESTIMATED AVERAGE GLUCOSE: 108 MG/DL
GLUCOSE QUALITATIVE U: NEGATIVE
GLUCOSE SERPL-MCNC: 95 MG/DL
HBA1C MFR BLD HPLC: 5.4 %
HCT VFR BLD CALC: 37.8 %
HDLC SERPL-MCNC: 79 MG/DL
HGB BLD-MCNC: 12.3 G/DL
IMM GRANULOCYTES NFR BLD AUTO: 0.2 %
KETONES URINE: NEGATIVE
LDLC SERPL CALC-MCNC: 139 MG/DL
LEUKOCYTE ESTERASE URINE: ABNORMAL
LYMPHOCYTES # BLD AUTO: 2.27 K/UL
LYMPHOCYTES NFR BLD AUTO: 35 %
MAGNESIUM SERPL-MCNC: 2.2 MG/DL
MAN DIFF?: NORMAL
MCHC RBC-ENTMCNC: 30.1 PG
MCHC RBC-ENTMCNC: 32.5 GM/DL
MCV RBC AUTO: 92.4 FL
MONOCYTES # BLD AUTO: 0.48 K/UL
MONOCYTES NFR BLD AUTO: 7.4 %
NEUTROPHILS # BLD AUTO: 3.62 K/UL
NEUTROPHILS NFR BLD AUTO: 55.7 %
NITRITE URINE: NEGATIVE
NONHDLC SERPL-MCNC: 152 MG/DL
PH URINE: 8
PHOSPHATE SERPL-MCNC: 3.9 MG/DL
PLATELET # BLD AUTO: 265 K/UL
POTASSIUM SERPL-SCNC: 4.7 MMOL/L
PROT SERPL-MCNC: 7.2 G/DL
PROTEIN URINE: ABNORMAL
RBC # BLD: 4.09 M/UL
RBC # FLD: 14.1 %
SODIUM SERPL-SCNC: 140 MMOL/L
SPECIFIC GRAVITY URINE: 1.02
TRIGL SERPL-MCNC: 63 MG/DL
TSH SERPL-ACNC: 2.21 UIU/ML
UROBILINOGEN URINE: NORMAL
WBC # FLD AUTO: 6.49 K/UL

## 2023-04-07 ENCOUNTER — NON-APPOINTMENT (OUTPATIENT)
Age: 63
End: 2023-04-07

## 2023-04-07 ENCOUNTER — APPOINTMENT (OUTPATIENT)
Dept: HEART AND VASCULAR | Facility: CLINIC | Age: 63
End: 2023-04-07
Payer: COMMERCIAL

## 2023-04-07 VITALS
DIASTOLIC BLOOD PRESSURE: 64 MMHG | SYSTOLIC BLOOD PRESSURE: 104 MMHG | OXYGEN SATURATION: 98 % | WEIGHT: 128 LBS | TEMPERATURE: 97.2 F | HEIGHT: 63 IN | BODY MASS INDEX: 22.68 KG/M2 | HEART RATE: 89 BPM

## 2023-04-07 DIAGNOSIS — R07.9 CHEST PAIN, UNSPECIFIED: ICD-10-CM

## 2023-04-07 PROCEDURE — 99214 OFFICE O/P EST MOD 30 MIN: CPT

## 2023-04-12 NOTE — CARDIOLOGY SUMMARY
[de-identified] : \par July 2022 TTE: EF 55%, no sig valve disease, no pericardial effusion [de-identified] : \par 3/11/22 EKG: sinus bradycardia at 56bpm\par April 2023 EKG reviewed SR, baseline artifact [de-identified] : \par July 2022 CCTA: significant motion artifact but CAC 0, distal RPDA nwv, remaining segmens nwv

## 2023-04-12 NOTE — HISTORY OF PRESENT ILLNESS
[FreeTextEntry1] : Shirley Pena is a 62 year old woman with HL (has declined statins), hx of melanoma, hypothyroidism who was referred for palpitations and is seen for follow-up. \par \par Last seen 2022. At that time, ordered for amb monitor, TTE, CCTA, Lp(a). Was hesitant to statin. \par \par Since then:\par -CCTA normal cors, Lp(a) wnl, statin deferred given low 10yr ascvd risk - discussion of genetic testing\par - in 2023, down from 178 in 2022\par - No further changes in medications\par \par Reports symptoms of discomfort at the 3-4 ICS of the L chest that is reproducible to palpation. States has noticed certain sleeping positions that help reduce discomfort. Not exacerbated by exertion/exercise. Denies associated SOB, no N/V. No dizziness/fatigue.\par \par HL:\par -diagnosed in her late teens\par -diagnosed with hypothyroidism in her late 20s -- felt much better after treatment\par -has been resistant to statins\par -reports LDL has been variable over the years but elevated on most recent labs\par \par PMH/PSH:\par as above; also\par arthroscopy\par oral surgeries\par \par FH\par father  at age 60 of [place]\par \par SH\par no tob\par occ etoh\par \par \par ALL\par sulfa\par milk\par \par ROS\par no fever/chills\par no nausea/vomiting\par \par Lifestyle History:\par Mediterranean Diet Score (9 question survey) was 6-7\par (8-9: optimal, 6-7: near-optimal, 4-5: suboptimal, 0-3: markedly suboptimal)\par Exercise: Patient reports exercising at a moderate level for 30-60 minutes per week. \par Smoking: Never smoker\par Denies snoring, rare gasping episodes in sleep, no excessive daytime fatigue\par \par No PCOS\par No pregnancies\par No miscarriages\par Menopause age 54 no HRT

## 2023-04-12 NOTE — END OF VISIT
[] : Fellow [FreeTextEntry3] : Patient seen and examined. Case discussed with cardiology fellow. Agree with plan as detailed above.

## 2023-04-12 NOTE — ASSESSMENT
[FreeTextEntry1] : Shirley Pena is a 62 year old woman with HL (declines statins), hx of melanoma, hypothyroidism who was referred for palpitations and is seen for follow-up. \par \par 1) HLD\par -TChol 231 (from 277) (from 178) on most recent labs (4/2023)\par - CCTA no calcium, no significant disease but limited 2/2 to motion artifact\par - TSH WNL, on synthroid\par - Other ASCVD risk factors are optimized: BP at goal on no medications, A1c WNL\par - Discussed utility of genetic testing for FH to further risk stratify need for statin; given young age at diagnosis and family history, reasonable to check genetic testing as pt would be more amenable to statin if positive\par \par 2) Chest wall discomfort\par Atypical pain at L 3-4ICS tenderness that is reproducible with palpation. Pending ultrasound with PCP, likely MSK etiology, given worsening symptoms, will pursue stress echocardiogram as CCTA was limited by motion artifact and pt does have family history\par \par Follow up pending stress echo and testing.

## 2023-04-12 NOTE — PHYSICAL EXAM
[Well Developed] : well developed [Well Nourished] : well nourished [Normal Conjunctiva] : normal conjunctiva [Normal Venous Pressure] : normal venous pressure [Normal S1, S2] : normal S1, S2 [No Murmur] : no murmur [Clear Lung Fields] : clear lung fields [Good Air Entry] : good air entry [No Respiratory Distress] : no respiratory distress  [Soft] : abdomen soft [Non Tender] : non-tender [Normal Gait] : normal gait [No Edema] : no edema [Moves all extremities] : moves all extremities [Alert and Oriented] : alert and oriented [de-identified] : DP pulses palpable b/l

## 2023-04-12 NOTE — REVIEW OF SYSTEMS
[Negative] : Neurological [Fever] : no fever [Headache] : no headache [SOB] : no shortness of breath [Dyspnea on exertion] : not dyspnea during exertion [Orthopnea] : no orthopnea [PND] : no PND [FreeTextEntry5] : Chest wall discomfort, occassional palpitation

## 2023-04-14 ENCOUNTER — APPOINTMENT (OUTPATIENT)
Dept: HEART AND VASCULAR | Facility: CLINIC | Age: 63
End: 2023-04-14

## 2023-05-19 ENCOUNTER — APPOINTMENT (OUTPATIENT)
Dept: HEART AND VASCULAR | Facility: CLINIC | Age: 63
End: 2023-05-19
Payer: COMMERCIAL

## 2023-05-19 VITALS
SYSTOLIC BLOOD PRESSURE: 99 MMHG | HEART RATE: 75 BPM | WEIGHT: 128 LBS | BODY MASS INDEX: 22.68 KG/M2 | DIASTOLIC BLOOD PRESSURE: 67 MMHG | RESPIRATION RATE: 20 BRPM | HEIGHT: 63 IN

## 2023-05-19 PROCEDURE — 93351 STRESS TTE COMPLETE: CPT

## 2023-07-17 DIAGNOSIS — M25.562 PAIN IN LEFT KNEE: ICD-10-CM

## 2023-07-17 DIAGNOSIS — N84.0 POLYP OF CORPUS UTERI: ICD-10-CM

## 2023-07-17 RX ORDER — LEVOTHYROXINE SODIUM 100 UG/1
100 TABLET ORAL DAILY
Qty: 90 | Refills: 3 | Status: ACTIVE | COMMUNITY
Start: 2021-12-30 | End: 1900-01-01

## 2023-07-20 ENCOUNTER — APPOINTMENT (OUTPATIENT)
Dept: CARDIOLOGY | Facility: CLINIC | Age: 63
End: 2023-07-20
Payer: COMMERCIAL

## 2023-07-20 ENCOUNTER — LABORATORY RESULT (OUTPATIENT)
Age: 63
End: 2023-07-20

## 2023-07-20 VITALS
WEIGHT: 132 LBS | TEMPERATURE: 98.3 F | HEIGHT: 63 IN | SYSTOLIC BLOOD PRESSURE: 105 MMHG | DIASTOLIC BLOOD PRESSURE: 72 MMHG | BODY MASS INDEX: 23.39 KG/M2 | HEART RATE: 80 BPM | OXYGEN SATURATION: 96 %

## 2023-07-20 DIAGNOSIS — Z82.49 FAMILY HISTORY OF ISCHEMIC HEART DISEASE AND OTHER DISEASES OF THE CIRCULATORY SYSTEM: ICD-10-CM

## 2023-07-20 PROCEDURE — 96040: CPT

## 2023-07-20 PROCEDURE — XXXXX: CPT | Mod: 1L

## 2023-07-20 NOTE — FAMILY HISTORY
[FreeTextEntry1] : FamilyHistory_20_twCiteListControlStart FamilyHistory_20_twCiteListControlEnd [id number] \par A four-generation family history was constructed and scanned into [employer]. \par Family history is significant for: \par siblings\par sister 59 yo med hx unk\par \par Mother dec 80s no HLD\par Maternal aunts x1 med hx unk dec\par Maternal uncles x1 med hx unk dec\par Maternal Grandmother dec 90s\par Maternal Grandfather dec 80s\par \par Father dec MI at 62 yo, HTN, HLD\par Paternal aunts x1 dec med hx unk\par Paternal uncles none\par Paternal Grandfather dec hx MS\par Paternal Grandmother dec hx MS\par \par children: none\par \par her maternal families originate from [place] and paternal families originate from [place]. \par + Ashkenazi Adventist ancestry. \par Family history was negative for consanguinity  \par No family history of SIDS\par  \par \par  [FreeTextEntry2] : [place] [FreeTextEntry3] :  [place].

## 2023-07-20 NOTE — REASON FOR VISIT
[FreeTextEntry3] : Dear Dr. Bravo    . I saw your patient HERSON SMILEY on 07/20/2023 . Please see the note below for the assessment and plan. \par \par HERSON SMILEY  was seen  for an initial consultation at the [facility] at [facility] on 07/20/2023.   Ms. SMILEY was referred by  DR. Bravo for hereditary cardiac predisposition risk assessment and counseling, due to FH\par

## 2023-07-20 NOTE — PHYSICAL EXAM
[Extraocular Movements Intact] : extraocular movements were intact [Scoliosis] : no scoliosis [Normal] : no rash, no stigmata of neurocutaneous disease [Tremor] : no tremor [de-identified] : no arcus [de-identified] : no xanthomas

## 2023-07-21 ENCOUNTER — APPOINTMENT (OUTPATIENT)
Dept: ORTHOPEDIC SURGERY | Facility: CLINIC | Age: 63
End: 2023-07-21
Payer: COMMERCIAL

## 2023-07-21 ENCOUNTER — RESULT REVIEW (OUTPATIENT)
Age: 63
End: 2023-07-21

## 2023-07-21 ENCOUNTER — OUTPATIENT (OUTPATIENT)
Dept: OUTPATIENT SERVICES | Facility: HOSPITAL | Age: 63
LOS: 1 days | End: 2023-07-21
Payer: COMMERCIAL

## 2023-07-21 VITALS
BODY MASS INDEX: 23.04 KG/M2 | DIASTOLIC BLOOD PRESSURE: 79 MMHG | SYSTOLIC BLOOD PRESSURE: 118 MMHG | HEART RATE: 102 BPM | HEIGHT: 63 IN | WEIGHT: 130 LBS | OXYGEN SATURATION: 97 %

## 2023-07-21 DIAGNOSIS — M25.561 PAIN IN RIGHT KNEE: ICD-10-CM

## 2023-07-21 DIAGNOSIS — M25.562 PAIN IN LEFT KNEE: ICD-10-CM

## 2023-07-21 DIAGNOSIS — M25.551 PAIN IN RIGHT HIP: ICD-10-CM

## 2023-07-21 DIAGNOSIS — M25.552 PAIN IN LEFT HIP: ICD-10-CM

## 2023-07-21 DIAGNOSIS — Z86.39 PERSONAL HISTORY OF OTHER ENDOCRINE, NUTRITIONAL AND METABOLIC DISEASE: ICD-10-CM

## 2023-07-21 PROCEDURE — 72170 X-RAY EXAM OF PELVIS: CPT

## 2023-07-21 PROCEDURE — 73564 X-RAY EXAM KNEE 4 OR MORE: CPT | Mod: 26,50

## 2023-07-21 PROCEDURE — 99204 OFFICE O/P NEW MOD 45 MIN: CPT

## 2023-07-21 PROCEDURE — 72170 X-RAY EXAM OF PELVIS: CPT | Mod: 26

## 2023-07-21 PROCEDURE — 73564 X-RAY EXAM KNEE 4 OR MORE: CPT

## 2023-07-24 ENCOUNTER — APPOINTMENT (OUTPATIENT)
Dept: OPHTHALMOLOGY | Facility: CLINIC | Age: 63
End: 2023-07-24

## 2023-07-24 PROBLEM — Z86.39 HISTORY OF HIGH CHOLESTEROL: Status: RESOLVED | Noted: 2023-07-21 | Resolved: 2023-07-24

## 2023-07-24 NOTE — DISCUSSION/SUMMARY
[de-identified] : 61 y/o female with bilateral knee osteoarthritis, left more symptomatic than right as well as history of left patellar instability. \par - We reviewed the diagnoses, natural history, and treatment options. 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 However, she does not feel that surgical management is required at this time for this level of symptoms and so we will continue with non-surgical care for the time being. \par - Begin physical therapy and home exercise program. \par - She cannot tolerate any pain medications and so we will avoid any systemic analgesics and anti-inflammatories. \par - We will obtain a left knee noncontrast MRI per her request to assess for any other osteochondral pathology. \par - I will her back with the results once available with any further recommendations. \par - We will attempt to procure a left knee neuromuscular support brace through our orthotist. \par - I recommended that we follow-up in two months with no new X-rays needed for further evaluation.
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
## 2023-07-24 NOTE — ADDENDUM
[FreeTextEntry1] : Documented by Albaro Kilpatrick acting as a scribe under Dr. Garcia. (07/22/2023)

## 2023-07-24 NOTE — END OF VISIT
[FreeTextEntry3] : Documented by Albaro Kilpatrick acting as a scribe for Dr. Jorden Garcia. 07/22/2023\par \par All medical record entries made by the Scribe were at my, Dr. Jorden Garcia, direction and\par personally dictated by me on 07/22/2023 . I have reviewed the chart and agree that the record\par accurately reflects my personal performance of the history, physical exam, assessment and\par plan. I have also personally directed, reviewed, and agreed with the chart.\par

## 2023-07-24 NOTE — PHYSICAL EXAM
[de-identified] : General appearance: well nourished and hydrated, pleasant, alert and oriented x 3, cooperative.  \par HEENT: normocephalic, EOM intact, wearing mask, external auditory canal clear.  \par Cardiovascular: no lower leg edema, no varicosities, dorsalis pedis pulses palpable and symmetric.  \par Lymphatics: no palpable lymphadenopathy, no lymphedema.  \par Neurologic: sensation is normal, no muscle weakness in upper or lower extremities, patella tendon reflexes present and symmetric.  \par Dermatologic: skin moist, warm, no rash.  \par Spine: cervical spine with normal lordosis and painless range of motion, thoracic spine with normal kyphosis and painless range of motion, lumbosacral spine with normal lordosis and painless range of motion.  No tenderness to palpation along midline spine and paraspinal musculature.  Sacroiliac joints nontender bilaterally. Negative SLR and crossed SLR tests bilaterally.\par Gait: normal.  \par \par Left knee: \par - Focal soft tissue swelling: none\par - Ecchymosis: none\par - Erythema: none\par - Effusion: moderate w/ no palpable Baker's cyst\par - Wounds: There does appear to be some soft tissue attenuation most likely consistent with a prior arthroscopic lateral release.\par - Alignment: normal\par - Tenderness: Medial joint line TTP. No lateral joint line tenderness. Crepitus, but relatively minimal pain with patella compression test\par - Right knee patellar mobility: one quadrant medially, one quadrant laterally\par - ROM: \par - Collateral laxity: none\par - Cruciate laxity: none\par - Popliteal angle (degrees): 30\par - Quad strength: 5/5\par \par Right knee:\par - Focal soft tissue swelling: none\par - Ecchymosis: none\par - Erythema: none\par - Effusion: Small w/ no palpable Baker's cyst\par - Wounds: none\par - Alignment: normal\par - Tenderness: slight medical joint line TTP. Negative patella compression test\par - Right knee patellar mobility: one quadrant medially, two quadrants lateral\par - ROM: 0-140\par - Collateral laxity: slight pseudolaxity to the varus\par - Cruciate laxity: none\par - Popliteal angle (degrees): 35\par - Quad strength: 5/5 [de-identified] : AP pelvis and 4 views of the bilateral knees (weightbearing AP, weightbearing Caputo, weightbearing lateral, and Sunrise) were interpreted by me and reviewed with the patient.\par \par Location of imaging: [facility]\par Date of exam: 07/21/2023\par \par Pelvic alignment: Normal\par \par Right hip -- \par Arthritis: None\par Deformity: None\par Osteonecrosis: None\par \par Left hip -- \par Arthritis: None\par Deformity: None\par Osteonecrosis: None\par \par Right knee -- \par Alignment: Mild varus\par Arthritis: Tricompartmental osteoarthritis most pronounced medially. Kellgren and Zheng 2.\par Patellar height: Normal\par Patellar tracking: Demonstrates mild lateral subluxation.\par \par Left knee -- \par Alignment: Varus\par Arthritis: Tricompartmental osteoarthritis which is moderate in the tibia femoral compartments and severe bone-on-bone in the patella femoral compartments. Kellgren and Zheng 2.\par Patellar height: Normal\par Patellar tracking: Demonstrates moderate lateral subluxation and preferential facet effacement\par

## 2023-07-26 ENCOUNTER — APPOINTMENT (OUTPATIENT)
Dept: MAMMOGRAPHY | Facility: CLINIC | Age: 63
End: 2023-07-26
Payer: COMMERCIAL

## 2023-07-26 ENCOUNTER — APPOINTMENT (OUTPATIENT)
Dept: ULTRASOUND IMAGING | Facility: CLINIC | Age: 63
End: 2023-07-26
Payer: COMMERCIAL

## 2023-07-26 ENCOUNTER — NON-APPOINTMENT (OUTPATIENT)
Age: 63
End: 2023-07-26

## 2023-07-26 ENCOUNTER — RESULT REVIEW (OUTPATIENT)
Age: 63
End: 2023-07-26

## 2023-07-26 PROCEDURE — 76830 TRANSVAGINAL US NON-OB: CPT

## 2023-07-26 PROCEDURE — 77066 DX MAMMO INCL CAD BI: CPT

## 2023-07-26 PROCEDURE — 76856 US EXAM PELVIC COMPLETE: CPT

## 2023-07-26 PROCEDURE — 76641 ULTRASOUND BREAST COMPLETE: CPT | Mod: 50

## 2023-07-26 PROCEDURE — 77062 BREAST TOMOSYNTHESIS BI: CPT

## 2023-07-27 ENCOUNTER — NON-APPOINTMENT (OUTPATIENT)
Age: 63
End: 2023-07-27

## 2023-07-27 ENCOUNTER — APPOINTMENT (OUTPATIENT)
Dept: GYNECOLOGIC ONCOLOGY | Facility: CLINIC | Age: 63
End: 2023-07-27

## 2023-07-27 ENCOUNTER — APPOINTMENT (OUTPATIENT)
Dept: GYNECOLOGIC ONCOLOGY | Facility: CLINIC | Age: 63
End: 2023-07-27
Payer: COMMERCIAL

## 2023-07-27 VITALS
DIASTOLIC BLOOD PRESSURE: 69 MMHG | WEIGHT: 130 LBS | SYSTOLIC BLOOD PRESSURE: 108 MMHG | HEART RATE: 101 BPM | OXYGEN SATURATION: 97 % | TEMPERATURE: 97.6 F | BODY MASS INDEX: 23.04 KG/M2 | HEIGHT: 63 IN

## 2023-07-27 DIAGNOSIS — Z12.4 ENCOUNTER FOR SCREENING FOR MALIGNANT NEOPLASM OF CERVIX: ICD-10-CM

## 2023-07-27 PROCEDURE — 99212 OFFICE O/P EST SF 10 MIN: CPT

## 2023-07-28 ENCOUNTER — NON-APPOINTMENT (OUTPATIENT)
Age: 63
End: 2023-07-28

## 2023-07-28 ENCOUNTER — APPOINTMENT (OUTPATIENT)
Dept: MRI IMAGING | Facility: CLINIC | Age: 63
End: 2023-07-28
Payer: COMMERCIAL

## 2023-07-28 ENCOUNTER — OUTPATIENT (OUTPATIENT)
Dept: OUTPATIENT SERVICES | Facility: HOSPITAL | Age: 63
LOS: 1 days | End: 2023-07-28

## 2023-07-28 LAB — HPV HIGH+LOW RISK DNA PNL CVX: NOT DETECTED

## 2023-07-28 PROCEDURE — 73721 MRI JNT OF LWR EXTRE W/O DYE: CPT | Mod: 26,LT

## 2023-07-28 NOTE — DISCUSSION/SUMMARY
[Reviewed Clinical Lab Test(s)] : Results of clinical tests were reviewed. [Reviewed Radiology Report(s)] : Radiology reports were reviewed. [Discuss Alternatives/Risks/Benefits w/Patient] : All alternatives, risks, and benefits were discussed with the patient/family and all questions were answered.  Patient expressed good understanding and appreciates the importance of follow up as recommended. [Pre Op] : The differential diagnosis was discussed in detail. The indications, risks, benefits and alternatives were discussed. [unfilled] expressed an understanding of the treatment rationale and her questions were answered to her apparent satisfaction. [FreeTextEntry2] : Small endometrial polyps, asymptomatic in a postmenopausal female:  likely benign [FreeTextEntry1] : - Unremarkbale GYN exam\par - repeat us in 1 year \par - Follow up in office in 1 year or prn. Pt encouraged to come in sooner if she experiences any postmenopausal bleeding. Pt verbalized understanding. \par cotesting done today

## 2023-07-28 NOTE — HISTORY OF PRESENT ILLNESS
[FreeTextEntry1] : Problem\par 1)Thickened endometrium\par 2)PMB\par \par Previous Therapy\par 1)US 11/28/17\par    a)Endometrium 8.6mm with two nodular structures, largest 1.0x0.5x1.2cm suspicious for polyps \par    b)R ovary with simple 15mm or 5mm (typo in radiology report)\par 2)Pelvic US 1/10/2018\par    a)Endometrium with two soft tissue nodules, largest 0.8x0.2x0.8cm \par    b)Right ovary with simple 10mm cyst, has decreased in size since 11/2017\par 3)Pelvic US 12/5/2018\par    a) Fluid in the endometrium, single layer endometrial thickness in that area is 1mm\par    b) Right fundal echogenic nodule measuring 0.5 x 0.3 x 0.5 cm, stable and c/w polyp. In the right anterior right endometrium, there is a 0.6 x0.2 x 0.5 cm nodule , stable and c/w polyp \par    c) singe cyst measuring 4mm\par 4) Pelvic US 11/2021- two endometrial polyps- measuring 0.9cm and 0.5cm \par 5) Pelvic US 7/022\par    a)  stable endometrial polyps 0.8cm and 0.3cm. Probable endometrial adhesion.Normal ovaries.\par 6) Pelvic US 7/2023 stable polyps and endometrial adhesion\par \par 63 y/o here for annual exam, with no acute complaints today. having L knee pain having MRI tomorrow. Still having mild L breast pain. Not sexually active currently. Had some urinary symptoms like a UTI, no clear evidence of UTI on UA, changed bathing habits and symptoms improved. \par \par Pt had h/o of PMPB and thickened endometrium  in 2018 and failed EMB. Pelvic sonogram from 1/2018 - 12/5/2018 showed stable polyps and the decision was made to continue to monitor. Pt reports no vaginal bleeding since last visit. \par \par Last pap smear: 5/2021- neg, HPV neg \par Last mammogram:  7/2022- BIRAD 1- negative \par Last colonoscopy- 12/2021 repeat 5 years\par Bone density 3/2022 osteopenia repeat 2 years  \par \par

## 2023-07-28 NOTE — PHYSICAL EXAM
[Chaperone Present] : A chaperone was present in the examining room during all aspects of the physical examination [Normal] : Recto-Vaginal Exam: Normal [Fully active, able to carry on all pre-disease performance without restriction] : Status 0 - Fully active, able to carry on all pre-disease performance without restriction [de-identified] : soft, non tender [de-identified] : Normal Symmetry BL, No palpable mass BL, No lymphadenopathy BL [de-identified] : Non tender [de-identified] : No adnexal fullness or palpable mass BL

## 2023-07-28 NOTE — REVIEW OF SYSTEMS
[Negative] : Musculoskeletal [Abn Vag Bleeding] : no abnormal vaginal bleeding [de-identified] : + Breast tenderness to left breast  [FreeTextEntry4] : Denies any postmenopausal bleeding

## 2023-08-02 LAB — CYTOLOGY CVX/VAG DOC THIN PREP: ABNORMAL

## 2023-08-07 ENCOUNTER — NON-APPOINTMENT (OUTPATIENT)
Age: 63
End: 2023-08-07

## 2023-08-07 ENCOUNTER — APPOINTMENT (OUTPATIENT)
Dept: OPHTHALMOLOGY | Facility: CLINIC | Age: 63
End: 2023-08-07
Payer: COMMERCIAL

## 2023-08-07 ENCOUNTER — TRANSCRIPTION ENCOUNTER (OUTPATIENT)
Age: 63
End: 2023-08-07

## 2023-08-07 PROCEDURE — 92134 CPTRZ OPH DX IMG PST SGM RTA: CPT

## 2023-08-07 PROCEDURE — 92014 COMPRE OPH EXAM EST PT 1/>: CPT

## 2023-09-27 ENCOUNTER — APPOINTMENT (OUTPATIENT)
Dept: CARDIOLOGY | Facility: CLINIC | Age: 63
End: 2023-09-27

## 2023-09-29 ENCOUNTER — APPOINTMENT (OUTPATIENT)
Dept: ORTHOPEDIC SURGERY | Facility: CLINIC | Age: 63
End: 2023-09-29

## 2023-11-15 ENCOUNTER — APPOINTMENT (OUTPATIENT)
Dept: CARDIOLOGY | Facility: CLINIC | Age: 63
End: 2023-11-15
Payer: COMMERCIAL

## 2023-11-15 PROCEDURE — 99215 OFFICE O/P EST HI 40 MIN: CPT | Mod: 95

## 2024-02-08 RX ORDER — HYALURONATE SODIUM 20 MG/2 ML
20 SYRINGE (ML) INTRAARTICULAR
Qty: 2 | Refills: 0 | Status: ACTIVE | OUTPATIENT
Start: 2024-02-08

## 2024-02-20 ENCOUNTER — APPOINTMENT (OUTPATIENT)
Dept: VASCULAR SURGERY | Facility: CLINIC | Age: 64
End: 2024-02-20
Payer: COMMERCIAL

## 2024-02-20 ENCOUNTER — OUTPATIENT (OUTPATIENT)
Dept: OUTPATIENT SERVICES | Facility: HOSPITAL | Age: 64
LOS: 1 days | End: 2024-02-20
Payer: COMMERCIAL

## 2024-02-20 ENCOUNTER — APPOINTMENT (OUTPATIENT)
Dept: ORTHOPEDIC SURGERY | Facility: CLINIC | Age: 64
End: 2024-02-20
Payer: COMMERCIAL

## 2024-02-20 ENCOUNTER — RESULT REVIEW (OUTPATIENT)
Age: 64
End: 2024-02-20

## 2024-02-20 VITALS
HEART RATE: 79 BPM | WEIGHT: 130 LBS | OXYGEN SATURATION: 97 % | BODY MASS INDEX: 23.04 KG/M2 | SYSTOLIC BLOOD PRESSURE: 130 MMHG | HEIGHT: 63 IN | DIASTOLIC BLOOD PRESSURE: 91 MMHG

## 2024-02-20 PROCEDURE — 73564 X-RAY EXAM KNEE 4 OR MORE: CPT

## 2024-02-20 PROCEDURE — 93971 EXTREMITY STUDY: CPT

## 2024-02-20 PROCEDURE — 99214 OFFICE O/P EST MOD 30 MIN: CPT

## 2024-02-20 PROCEDURE — 73564 X-RAY EXAM KNEE 4 OR MORE: CPT | Mod: 26,LT

## 2024-02-23 NOTE — PHYSICAL EXAM
[de-identified] : AP pelvis and 4 views of the bilateral knees (weightbearing AP, weightbearing Caputo, weightbearing lateral, and Sunrise) were interpreted by me and reviewed with the patient.  Location of imaging: [facility] Date of exam: 2/20/24  Left knee --  Alignment: normal Arthritis: tricompartmental osteoarthritis which remains relatively moderate in the tibia femoral compartment and severe bone-on-bone in the patella femoral compartment. K&L 3 Patellar height: normal Patellar tracking: lateral tilt and subluxation [de-identified] :  General appearance: well nourished and hydrated, pleasant, alert and oriented x 3, cooperative.   HEENT: normocephalic, EOM intact, wearing mask, external auditory canal clear.   Cardiovascular: no lower leg edema, no varicosities, dorsalis pedis pulses palpable and symmetric.   Lymphatics: no palpable lymphadenopathy, no lymphedema.   Neurologic: sensation is normal, no muscle weakness in upper or lower extremities, patella tendon reflexes present and symmetric.   Dermatologic: skin moist, warm, no rash.   Spine: cervical spine with normal lordosis and painless range of motion, thoracic spine with normal kyphosis and painless range of motion, lumbosacral spine with normal lordosis and painless range of motion.  No tenderness to palpation along midline spine and paraspinal musculature.  Sacroiliac joints nontender bilaterally. Negative SLR and crossed SLR tests bilaterally. Gait: Patient presents today with the use of a cane. She is limping very heavily and essentially hopping on the other foot. She demonstrates severe left sided antalgia.   Left knee:  - Focal soft tissue swelling: none - Ecchymosis: none - Erythema: none - Effusion: moderate, with no palpable Baker's cyst - Wounds: healed arthroscopic portals, benign appearing.  - Alignment: normal - Tenderness: she does have TTP along the vastus medialis obliquus muscle belly but none along the medial adductors and none thgouhtout the course of the rectus femoris.  - ROM:  - Collateral laxity: none - Cruciate laxity: none - Meniscal signs: negative Carol and Jason - Popliteal angle (degrees): difficult to assess secondary to her massive flexion contracture.  - Quad strength: 4+/5 - There is audible patellofemoral crepitus with resistant flexion No
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
## 2024-02-23 NOTE — END OF VISIT
[FreeTextEntry3] : All medical record entries made by the Scribe were at my, Dr. Jorden Garcia, direction and personally dictated by me on 02/20/2024. I have reviewed the chart and agree that the record accurately reflects my personal performance of the history, physical exam, assessment and plan. I have also personally directed, reviewed, and agreed with the chart.

## 2024-03-01 ENCOUNTER — APPOINTMENT (OUTPATIENT)
Dept: MRI IMAGING | Facility: CLINIC | Age: 64
End: 2024-03-01
Payer: COMMERCIAL

## 2024-03-01 ENCOUNTER — APPOINTMENT (OUTPATIENT)
Dept: ORTHOPEDIC SURGERY | Facility: CLINIC | Age: 64
End: 2024-03-01
Payer: COMMERCIAL

## 2024-03-01 ENCOUNTER — OUTPATIENT (OUTPATIENT)
Dept: OUTPATIENT SERVICES | Facility: HOSPITAL | Age: 64
LOS: 1 days | End: 2024-03-01

## 2024-03-01 VITALS
SYSTOLIC BLOOD PRESSURE: 113 MMHG | DIASTOLIC BLOOD PRESSURE: 77 MMHG | BODY MASS INDEX: 23.04 KG/M2 | HEIGHT: 63 IN | HEART RATE: 110 BPM | OXYGEN SATURATION: 98 % | WEIGHT: 130 LBS

## 2024-03-01 PROCEDURE — 20610 DRAIN/INJ JOINT/BURSA W/O US: CPT | Mod: 50

## 2024-03-01 PROCEDURE — 73721 MRI JNT OF LWR EXTRE W/O DYE: CPT | Mod: 26,LT

## 2024-03-04 NOTE — PROCEDURE
[de-identified] : euflexxa left knee Lot#- [id number] exp- 2025-03-30 man- ferring  ndc- [id number]   Procedure: Knee joint injection Laterality: LEFT Indication: Osteoarthritis - discussed with patient Skin prep: alcohol and chlorhexidine Anesthesia: ethyl chloride spray Needle: 18-gauge Portal: superolateral Aspiration: 60 cc of blood tinged synovial fluid Injectate: Euflexxa 1/3 Dressing: Band-aid Complications: None  - RTC in 1 week to continue left knee Euflexxa series - Will send left knee aspirate for cell count, crystals, culture - Will follow up with MRI results once completed

## 2024-03-05 LAB
B PERT IGG+IGM PNL SER: ABNORMAL
COLOR FLD: NORMAL
EOSINOPHIL # FLD MANUAL: 0 %
FLUID INTAKE SUBSTANCE CLASS: NORMAL
LYMPHOCYTES # FLD MANUAL: 1 %
MESOTHL CELL NFR FLD: 0 %
MONOS+MACROS NFR FLD MANUAL: 11 %
NEUTS SEG # FLD MANUAL: 88 %
NRBC # FLD: 0 %
RBC # FLD MANUAL: ABNORMAL /UL
SYCRY CLARITY: ABNORMAL
SYCRY COLOR: ABNORMAL
SYCRY ID: ABNORMAL
SYCRY TUBE: NORMAL
TOTAL CELLS COUNTED FLD: 5900 /UL
TUBE TYPE: NORMAL
UNIDENT CELLS NFR FLD MANUAL: 0 %
VARIANT LYMPHS # FLD MANUAL: 0 %

## 2024-03-05 RX ORDER — METHYLPREDNISOLONE 4 MG/1
4 TABLET ORAL
Qty: 21 | Refills: 0 | Status: ACTIVE | COMMUNITY
Start: 2024-03-05 | End: 1900-01-01

## 2024-03-08 ENCOUNTER — APPOINTMENT (OUTPATIENT)
Dept: ORTHOPEDIC SURGERY | Facility: CLINIC | Age: 64
End: 2024-03-08
Payer: COMMERCIAL

## 2024-03-08 PROCEDURE — 20610 DRAIN/INJ JOINT/BURSA W/O US: CPT | Mod: 50

## 2024-03-08 RX ORDER — MELOXICAM 7.5 MG/1
7.5 TABLET ORAL DAILY
Qty: 21 | Refills: 1 | Status: ACTIVE | COMMUNITY
Start: 2024-03-08 | End: 1900-01-01

## 2024-03-11 ENCOUNTER — APPOINTMENT (OUTPATIENT)
Dept: INTERNAL MEDICINE | Facility: CLINIC | Age: 64
End: 2024-03-11

## 2024-03-11 NOTE — DISCUSSION/SUMMARY
[de-identified] : - I informed her that it does not make sense for her to be taking any doses of ibuprofen if she has a history of duodenal ulcers and that I think that it would be safer for her to use selective STRONG-2 inhibitors and therefore I prescribed her with a low dose of meloxicam today.  - She will continue to discuss the Medrol dose pack situation with her other internists, and she will return to the office next week for her final left knee injection.

## 2024-03-11 NOTE — PROCEDURE
[de-identified] : euflexx injection in left knee joint.  exp: 03/30/2025 lot: [id number] man: ashlee ndc: [id number]

## 2024-03-11 NOTE — PROCEDURE
[de-identified] : euflexx injection in left knee joint.  exp: 03/30/2025 lot: [id number] man: ashlee ndc: [id number]
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
## 2024-03-11 NOTE — DISCUSSION/SUMMARY
[de-identified] : - I informed her that it does not make sense for her to be taking any doses of ibuprofen if she has a history of duodenal ulcers and that I think that it would be safer for her to use selective STRONG-2 inhibitors and therefore I prescribed her with a low dose of meloxicam today.  - She will continue to discuss the Medrol dose pack situation with her other internists, and she will return to the office next week for her final left knee injection.
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
## 2024-03-11 NOTE — DISCUSSION/SUMMARY
[de-identified] : - I informed her that it does not make sense for her to be taking any doses of ibuprofen if she has a history of duodenal ulcers and that I think that it would be safer for her to use selective STRONG-2 inhibitors and therefore I prescribed her with a low dose of meloxicam today.  - She will continue to discuss the Medrol dose pack situation with her other internists, and she will return to the office next week for her final left knee injection.

## 2024-03-11 NOTE — END OF VISIT
[FreeTextEntry3] :  All medical record entries made by the Scribe were at my, Dr. Jorden Garcia's, direction and personally dictated by me on 03/08/2024. I have reviewed the chart and agree that the record accurately reflects my personal performance of the history, physical exam, assessment and plan. I have also personally directed, reviewed, and agreed with the chart.

## 2024-03-14 ENCOUNTER — APPOINTMENT (OUTPATIENT)
Dept: INTERNAL MEDICINE | Facility: CLINIC | Age: 64
End: 2024-03-14
Payer: COMMERCIAL

## 2024-03-14 VITALS
BODY MASS INDEX: 22.5 KG/M2 | HEIGHT: 63 IN | RESPIRATION RATE: 18 BRPM | TEMPERATURE: 97.8 F | HEART RATE: 80 BPM | OXYGEN SATURATION: 97 % | DIASTOLIC BLOOD PRESSURE: 58 MMHG | SYSTOLIC BLOOD PRESSURE: 101 MMHG | WEIGHT: 127 LBS

## 2024-03-14 DIAGNOSIS — R35.0 FREQUENCY OF MICTURITION: ICD-10-CM

## 2024-03-14 DIAGNOSIS — M11.20 OTHER CHONDROCALCINOSIS, UNSPECIFIED SITE: ICD-10-CM

## 2024-03-14 DIAGNOSIS — E78.5 HYPERLIPIDEMIA, UNSPECIFIED: ICD-10-CM

## 2024-03-14 DIAGNOSIS — E03.9 HYPOTHYROIDISM, UNSPECIFIED: ICD-10-CM

## 2024-03-14 DIAGNOSIS — Z92.3 PERSONAL HISTORY OF IRRADIATION: ICD-10-CM

## 2024-03-14 PROCEDURE — G2211 COMPLEX E/M VISIT ADD ON: CPT

## 2024-03-14 PROCEDURE — 99214 OFFICE O/P EST MOD 30 MIN: CPT

## 2024-03-14 RX ORDER — DOXYCYCLINE HYCLATE 100 MG/1
100 TABLET ORAL
Qty: 14 | Refills: 0 | Status: COMPLETED | COMMUNITY
Start: 2022-03-02 | End: 2024-03-14

## 2024-03-14 RX ORDER — NITROFURANTOIN (MONOHYDRATE/MACROCRYSTALS) 25; 75 MG/1; MG/1
100 CAPSULE ORAL
Qty: 10 | Refills: 0 | Status: COMPLETED | COMMUNITY
Start: 2023-04-04 | End: 2024-03-14

## 2024-03-14 NOTE — END OF VISIT
[FreeTextEntry3] :  I personally performed the service described in the documentation, reviewed the documentation recorded by the Nena Wright in my presence and it accurately and completely records my words and actions.  I, Dr. Curran, personally performed the evaluation and management (E/M) services for this established patient who presents today with (a) new problem(s)/exacerbation of (an) existing condition(s).  That E/M includes conducting the examination, assessing all new/exacerbated conditions, and establishing a new plan of care.  Today, my PA, Meena Haas, was here to observe my evaluation and management services for this new problem/exacerbated condition to be followed going forward.

## 2024-03-14 NOTE — PHYSICAL EXAM
[No Acute Distress] : no acute distress [Well-Appearing] : well-appearing [Normal] : affect was normal and insight and judgment were intact [de-identified] : swollen L knee, mild warmth, no erythema, decreased ROM.

## 2024-03-14 NOTE — HISTORY OF PRESENT ILLNESS
[FreeTextEntry8] :  Pt is a 63-year-old F with PMHx of HLD, hypothyroidism, osteopenia, and L knee pain who presents to the office today for eval of CPDD  CPDD:  - pt endorses having L knee pain which began approx 1 month ago - she endorses being unable to straighten her leg, knee swelling, warmth, decreased ROM. - she currently has had euflexxa injections, which help with pain but not for long. She will have a third injection this month.  - March 1st, date of first injection she had 50ml of fluid drained.  - She is hesitant to start pharmacotherapy due to concerns for GI issues (hx of ulcers), she does not take antacids - She reports she is unable to do PT because of distance.   Urinary frequency:  -Pt endorses having worsening urinary frequency.  - She endorses having had to go to the restroom quickly after drinking something.  - She reports that she has interest in seeing urology.  - She denies leakage, hematuria, dysuria

## 2024-03-15 ENCOUNTER — APPOINTMENT (OUTPATIENT)
Dept: ORTHOPEDIC SURGERY | Facility: CLINIC | Age: 64
End: 2024-03-15
Payer: COMMERCIAL

## 2024-03-15 VITALS
WEIGHT: 127 LBS | OXYGEN SATURATION: 91 % | DIASTOLIC BLOOD PRESSURE: 78 MMHG | BODY MASS INDEX: 22.5 KG/M2 | HEIGHT: 63 IN | SYSTOLIC BLOOD PRESSURE: 128 MMHG | HEART RATE: 80 BPM

## 2024-03-15 DIAGNOSIS — M17.0 BILATERAL PRIMARY OSTEOARTHRITIS OF KNEE: ICD-10-CM

## 2024-03-15 PROCEDURE — 20610 DRAIN/INJ JOINT/BURSA W/O US: CPT | Mod: LT

## 2024-03-18 LAB — JOINT CULTURE: NORMAL

## 2024-03-25 PROBLEM — M17.0 PRIMARY OSTEOARTHRITIS OF BOTH KNEES: Status: ACTIVE | Noted: 2023-07-21

## 2024-03-25 NOTE — PROCEDURE
[de-identified] : euflexxa injection in left knee joint.  exp: 03/30/2025 lot: [id number] man: ashlee ndc # : [id number]   Procedure: Knee joint injection Laterality: LEFT Indication: Osteoarthritis - discussed with patient Skin prep: alcohol and chlorhexidine Anesthesia: ethyl chloride spray Needle: 20-gauge Portal: inferolateral Aspiration: none Injectate: Euflexxa 3/3 Dressing: Band-aid Complications: None  - Advisd patient to contact the office for any new or worsening symptoms

## 2024-04-19 ENCOUNTER — APPOINTMENT (OUTPATIENT)
Dept: OTOLARYNGOLOGY | Facility: CLINIC | Age: 64
End: 2024-04-19
Payer: COMMERCIAL

## 2024-04-19 VITALS
TEMPERATURE: 97.4 F | DIASTOLIC BLOOD PRESSURE: 74 MMHG | SYSTOLIC BLOOD PRESSURE: 109 MMHG | OXYGEN SATURATION: 99 % | HEART RATE: 78 BPM | WEIGHT: 127 LBS | HEIGHT: 63 IN | BODY MASS INDEX: 22.5 KG/M2

## 2024-04-19 DIAGNOSIS — K11.20 SIALOADENITIS, UNSPECIFIED: ICD-10-CM

## 2024-04-19 PROCEDURE — 31575 DIAGNOSTIC LARYNGOSCOPY: CPT

## 2024-04-19 PROCEDURE — 99204 OFFICE O/P NEW MOD 45 MIN: CPT | Mod: 25

## 2024-04-22 NOTE — HISTORY OF PRESENT ILLNESS
[de-identified] : 4/19/24: 62 y/o F presents with intermittent swelling of her left submandibular gland for the past 30 +years. She has history of several dental/ orthodontic issues. 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 She has history of ?condylar surgery and braces as a child. She has history of dental abscess from wisdom tooth removal and had surgery in [place] and jaw was broken. She was diagnosed with enlarged submandibular gland in 1991. She reports it swells when she gets sick, or if she is exposed to allergens and then returns to her baseline. When this occurs she drinks more water. She reports her tongue intermittently swells and is concerned it was related to biting her tongue. When this occurs she uses a saltwater rinse. She has history of HPV. No issues eating, chewing, or swallowing. She denies fevers, chills, night sweats, or unintentional weight loss. No voice issues.

## 2024-04-22 NOTE — ASSESSMENT
[FreeTextEntry1] : - 4/19/24: 64 y/o F presents with intermittent swelling of her left submandibular gland for the past 30 +years. She has history of several dental/ orthodontic issues. Physical exam/ laryngoscopy were normal. I suspect she may be suffering from sialadenitis, given handout or sequelae from possible scar tissue. I am recommending increased hydration, warm compresses, and sialagogues such as lemon candies. Follow up with me if symptoms recur/ worsen.  -increased hydration -sialadenitis handout; warm packs, sialagogues, such as lemon candies  -Follow up with me if symptoms recur/ worsen, then consider US vs CT neck

## 2024-04-22 NOTE — PROCEDURE
[de-identified] : -   Pre-operative Diagnosis: concern for left submandibular gland swelling Post-operative Diagnosis: Normal exam  Anesthesia: Topical - 1 % Lidocaine/Phenylephrine Procedure: Flexible Laryngoscopy   Procedure Details: The patient was placed in the sitting position. After decongestant and anesthesia were applied the laryngoscope was passed. The nasal cavities, nasopharynx, oropharynx, hypopharynx, and larynx were all examined. Vocal folds were examined during respiration and phonation. The following findings were noted:   Findings: Nose: Septum is midline, turbinates are normal, nasal airways patent, mucosa normal Nasopharynx: Adenoids normal, no masses, eustachian tube normal Oropharynx: Pharyngeal walls symmetric and without lesion. Tonsils/fossae symmetric and normal. Hypopharynx: Hypopharynx and pyriform sinuses without lesion. No masses or asymmetry. No pooling of secretions. Larynx: Epiglottis and aryepiglottic folds were sharp and crisp bilaterally. Bilateral false and true vocal folds normal appearance. Bilateral vocal folds fully mobile and symmetric. Airway was widely patent.   Condition: Stable. Patient tolerated procedure well.   Complications: None

## 2024-04-23 ENCOUNTER — RESULT CHARGE (OUTPATIENT)
Age: 64
End: 2024-04-23

## 2024-04-24 ENCOUNTER — APPOINTMENT (OUTPATIENT)
Dept: UROLOGY | Facility: CLINIC | Age: 64
End: 2024-04-24
Payer: COMMERCIAL

## 2024-04-24 ENCOUNTER — APPOINTMENT (OUTPATIENT)
Dept: RHEUMATOLOGY | Facility: CLINIC | Age: 64
End: 2024-04-24

## 2024-04-24 VITALS
SYSTOLIC BLOOD PRESSURE: 103 MMHG | TEMPERATURE: 98 F | HEART RATE: 88 BPM | OXYGEN SATURATION: 97 % | DIASTOLIC BLOOD PRESSURE: 71 MMHG

## 2024-04-24 LAB
BILIRUB UR QL STRIP: NORMAL
CLARITY UR: CLEAR
COLLECTION METHOD: NORMAL
GLUCOSE UR-MCNC: NORMAL
HCG UR QL: 0.2 EU/DL
HGB UR QL STRIP.AUTO: NORMAL
KETONES UR-MCNC: NORMAL
LEUKOCYTE ESTERASE UR QL STRIP: NORMAL
NITRITE UR QL STRIP: NORMAL
PH UR STRIP: 7
PROT UR STRIP-MCNC: NORMAL
SP GR UR STRIP: 1.01

## 2024-04-24 PROCEDURE — 99204 OFFICE O/P NEW MOD 45 MIN: CPT

## 2024-04-25 LAB
APPEARANCE: CLEAR
BACTERIA: NEGATIVE /HPF
BILIRUBIN URINE: NEGATIVE
BLOOD URINE: NEGATIVE
CAST: 0 /LPF
COLOR: YELLOW
EPITHELIAL CELLS: 1 /HPF
GLUCOSE QUALITATIVE U: NEGATIVE MG/DL
KETONES URINE: NEGATIVE MG/DL
LEUKOCYTE ESTERASE URINE: ABNORMAL
MICROSCOPIC-UA: NORMAL
NITRITE URINE: NEGATIVE
PH URINE: 7
PROTEIN URINE: NEGATIVE MG/DL
RED BLOOD CELLS URINE: 0 /HPF
REVIEW: NORMAL
SPECIFIC GRAVITY URINE: 1.01
UROBILINOGEN URINE: 0.2 MG/DL
WHITE BLOOD CELLS URINE: 0 /HPF

## 2024-04-25 NOTE — ASSESSMENT
[FreeTextEntry1] : 62 yo female with slight dysuria, "frothy" urine and nocturia.    Will send today's urine for UA/UC- she will call Monday for the results.  If she has microscopic hematuia- will need work-up.   If her urinalysis and culture results are negative, we will hold on further intervention.  We brieftly discussed UDS as her voiding pattern is dysfunctional.    The total amount of time I have personally spent preparing for this visit, reviewing the patient's test results, obtaining external history, ordering tests/medications, documenting clinical information, communicating with and counseling the patient/family and/or caregiver(s), reviewing old records, and spent face to face with the patient explaining the above was 45 minutes.

## 2024-04-26 ENCOUNTER — TRANSCRIPTION ENCOUNTER (OUTPATIENT)
Age: 64
End: 2024-04-26

## 2024-04-26 LAB
25(OH)D3 SERPL-MCNC: 89.9 NG/ML
ALBUMIN SERPL ELPH-MCNC: 4.3 G/DL
ALP BLD-CCNC: 65 U/L
ALT SERPL-CCNC: 15 U/L
ANION GAP SERPL CALC-SCNC: 14 MMOL/L
APPEARANCE: CLEAR
AST SERPL-CCNC: 28 U/L
BACTERIA UR CULT: NORMAL
BASOPHILS # BLD AUTO: 0.04 K/UL
BASOPHILS NFR BLD AUTO: 0.6 %
BILIRUB SERPL-MCNC: 0.5 MG/DL
BILIRUBIN URINE: NEGATIVE
BLOOD URINE: NEGATIVE
BUN SERPL-MCNC: 11 MG/DL
CALCIUM SERPL-MCNC: 9.3 MG/DL
CHLORIDE SERPL-SCNC: 102 MMOL/L
CHOLEST SERPL-MCNC: 230 MG/DL
CO2 SERPL-SCNC: 24 MMOL/L
COLOR: NORMAL
CREAT SERPL-MCNC: 0.65 MG/DL
EGFR: 100 ML/MIN/1.73M2
EOSINOPHIL # BLD AUTO: 0.1 K/UL
EOSINOPHIL NFR BLD AUTO: 1.6 %
ESTIMATED AVERAGE GLUCOSE: 108 MG/DL
FERRITIN SERPL-MCNC: 94 NG/ML
GLUCOSE QUALITATIVE U: NEGATIVE MG/DL
GLUCOSE SERPL-MCNC: 92 MG/DL
HBA1C MFR BLD HPLC: 5.4 %
HCT VFR BLD CALC: 36.3 %
HCV AB SER QL: NONREACTIVE
HCV S/CO RATIO: 0.07 S/CO
HDLC SERPL-MCNC: 70 MG/DL
HGB BLD-MCNC: 11.6 G/DL
IMM GRANULOCYTES NFR BLD AUTO: 0.2 %
IRON SATN MFR SERPL: 37 %
IRON SERPL-MCNC: 105 UG/DL
KETONES URINE: ABNORMAL MG/DL
LDLC SERPL CALC-MCNC: 145 MG/DL
LEUKOCYTE ESTERASE URINE: NEGATIVE
LYMPHOCYTES # BLD AUTO: 2.91 K/UL
LYMPHOCYTES NFR BLD AUTO: 46 %
MAGNESIUM SERPL-MCNC: 1.9 MG/DL
MAN DIFF?: NORMAL
MCHC RBC-ENTMCNC: 29.4 PG
MCHC RBC-ENTMCNC: 32 GM/DL
MCV RBC AUTO: 92.1 FL
MONOCYTES # BLD AUTO: 0.55 K/UL
MONOCYTES NFR BLD AUTO: 8.7 %
NEUTROPHILS # BLD AUTO: 2.72 K/UL
NEUTROPHILS NFR BLD AUTO: 42.9 %
NITRITE URINE: NEGATIVE
NONHDLC SERPL-MCNC: 160 MG/DL
PH URINE: 5.5
PHOSPHATE SERPL-MCNC: 3.6 MG/DL
PLATELET # BLD AUTO: 325 K/UL
POTASSIUM SERPL-SCNC: 4.3 MMOL/L
PROT SERPL-MCNC: 7.2 G/DL
PROTEIN URINE: NORMAL MG/DL
RBC # BLD: 3.94 M/UL
RBC # FLD: 13.2 %
SODIUM SERPL-SCNC: 139 MMOL/L
SPECIFIC GRAVITY URINE: >1.03
TIBC SERPL-MCNC: 287 UG/DL
TRIGL SERPL-MCNC: 85 MG/DL
TSH SERPL-ACNC: 1.05 UIU/ML
UIBC SERPL-MCNC: 182 UG/DL
UROBILINOGEN URINE: 0.2 MG/DL
WBC # FLD AUTO: 6.33 K/UL

## 2024-05-08 ENCOUNTER — NON-APPOINTMENT (OUTPATIENT)
Age: 64
End: 2024-05-08

## 2024-07-30 DIAGNOSIS — N83.291 OTHER OVARIAN CYST, RIGHT SIDE: ICD-10-CM

## 2024-07-30 DIAGNOSIS — Z12.39 ENCOUNTER FOR OTHER SCREENING FOR MALIGNANT NEOPLASM OF BREAST: ICD-10-CM

## 2024-08-01 ENCOUNTER — RESULT REVIEW (OUTPATIENT)
Age: 64
End: 2024-08-01

## 2024-08-01 ENCOUNTER — APPOINTMENT (OUTPATIENT)
Dept: GYNECOLOGIC ONCOLOGY | Facility: CLINIC | Age: 64
End: 2024-08-01

## 2024-08-01 ENCOUNTER — APPOINTMENT (OUTPATIENT)
Dept: MAMMOGRAPHY | Facility: CLINIC | Age: 64
End: 2024-08-01
Payer: COMMERCIAL

## 2024-08-01 ENCOUNTER — APPOINTMENT (OUTPATIENT)
Dept: ULTRASOUND IMAGING | Facility: CLINIC | Age: 64
End: 2024-08-01
Payer: COMMERCIAL

## 2024-08-01 VITALS
DIASTOLIC BLOOD PRESSURE: 73 MMHG | SYSTOLIC BLOOD PRESSURE: 106 MMHG | BODY MASS INDEX: 23.04 KG/M2 | OXYGEN SATURATION: 95 % | HEIGHT: 63 IN | HEART RATE: 99 BPM | WEIGHT: 130 LBS | TEMPERATURE: 96.8 F

## 2024-08-01 DIAGNOSIS — Z12.4 ENCOUNTER FOR SCREENING FOR MALIGNANT NEOPLASM OF CERVIX: ICD-10-CM

## 2024-08-01 PROCEDURE — 99396 PREV VISIT EST AGE 40-64: CPT

## 2024-08-01 PROCEDURE — 76641 ULTRASOUND BREAST COMPLETE: CPT | Mod: 50

## 2024-08-01 PROCEDURE — 77067 SCR MAMMO BI INCL CAD: CPT

## 2024-08-01 PROCEDURE — 76856 US EXAM PELVIC COMPLETE: CPT

## 2024-08-01 PROCEDURE — 76830 TRANSVAGINAL US NON-OB: CPT

## 2024-08-01 PROCEDURE — 77063 BREAST TOMOSYNTHESIS BI: CPT

## 2024-08-02 NOTE — DISCUSSION/SUMMARY
[Reviewed Clinical Lab Test(s)] : Results of clinical tests were reviewed. [Reviewed Radiology Report(s)] : Radiology reports were reviewed. [Discuss Alternatives/Risks/Benefits w/Patient] : All alternatives, risks, and benefits were discussed with the patient/family and all questions were answered.  Patient expressed good understanding and appreciates the importance of follow up as recommended. [Pre Op] : The differential diagnosis was discussed in detail. The indications, risks, benefits and alternatives were discussed. [unfilled] expressed an understanding of the treatment rationale and her questions were answered to her apparent satisfaction. [FreeTextEntry2] : Small endometrial polyps, asymptomatic in a postmenopausal female:  likely benign [FreeTextEntry1] : - Unremarkbale GYN exam - repeat us in 1 year  - Follow up in office in 1 year or prn.  cotesting done today  Due for bone density - will let me know when she is recovered after surgery and I will order

## 2024-08-02 NOTE — HISTORY OF PRESENT ILLNESS
[FreeTextEntry1] : Problem 1)Thickened endometrium 2)PMB  Previous Therapy 1)US 11/28/17    a)Endometrium 8.6mm with two nodular structures, largest 1.0x0.5x1.2cm suspicious for polyps     b)R ovary with simple 15mm or 5mm (typo in radiology report) 2)Pelvic US 1/10/2018    a)Endometrium with two soft tissue nodules, largest 0.8x0.2x0.8cm     b)Right ovary with simple 10mm cyst, has decreased in size since 11/2017 3)Pelvic US 12/5/2018    a) Fluid in the endometrium, single layer endometrial thickness in that area is 1mm    b) Right fundal echogenic nodule measuring 0.5 x 0.3 x 0.5 cm, stable and c/w polyp. In the right anterior right endometrium, there is a 0.6 x0.2 x 0.5 cm nodule , stable and c/w polyp     c) singe cyst measuring 4mm 4) Pelvic US 11/2021- two endometrial polyps- measuring 0.9cm and 0.5cm  5) Pelvic US 7/022    a)  stable endometrial polyps 0.8cm and 0.3cm. Probable endometrial adhesion. Normal ovaries. 6) Pelvic US 7/2023 stable polyps and endometrial adhesion 7) Pelvic US 8/2024 stable   Here for annual exam. Feeling well except planning L knee replacement next month. Has had longstanding arthritis in this knee but it worsened over the weekend. Feeling bloated and constipated but due to iron supplements recommended before surgery. Denies vaginal bleeding, discharge. No bothersome menopausal symptoms. Doesn't sleep well but more scheduling rather than insomnia. Uses vaginal vitamin E oil for vaginal dryness which works well.   Last pap smear: 5/2021- neg, HPV neg  Last mammogram:  8/1/24- BIRAD 1- negative  Last colonoscopy- 12/2021 repeat 5 years Bone density 3/2022 osteopenia repeat 2 years

## 2024-08-02 NOTE — PHYSICAL EXAM
[Chaperone Present] : A chaperone was present in the examining room during all aspects of the physical examination [Normal] : Anus and perineum: Normal sphincter tone, no masses, no prolapse. [Fully active, able to carry on all pre-disease performance without restriction] : Status 0 - Fully active, able to carry on all pre-disease performance without restriction [de-identified] : soft, non tender [de-identified] : Normal Symmetry BL, No palpable mass BL, No lymphadenopathy BL [de-identified] : No adnexal fullness or palpable mass BL  [de-identified] : Non tender

## 2024-08-02 NOTE — PHYSICAL EXAM
[Chaperone Present] : A chaperone was present in the examining room during all aspects of the physical examination [Normal] : Anus and perineum: Normal sphincter tone, no masses, no prolapse. [Fully active, able to carry on all pre-disease performance without restriction] : Status 0 - Fully active, able to carry on all pre-disease performance without restriction [de-identified] : soft, non tender [de-identified] : Normal Symmetry BL, No palpable mass BL, No lymphadenopathy BL [de-identified] : No adnexal fullness or palpable mass BL  [de-identified] : Non tender

## 2024-08-02 NOTE — REVIEW OF SYSTEMS
[Negative] : Musculoskeletal [Abn Vag Bleeding] : no abnormal vaginal bleeding [de-identified] : + Breast tenderness to left breast  [FreeTextEntry4] : Denies any postmenopausal bleeding

## 2024-08-02 NOTE — REVIEW OF SYSTEMS
[Negative] : Musculoskeletal [Abn Vag Bleeding] : no abnormal vaginal bleeding [de-identified] : + Breast tenderness to left breast  [FreeTextEntry4] : Denies any postmenopausal bleeding

## 2024-08-05 ENCOUNTER — TRANSCRIPTION ENCOUNTER (OUTPATIENT)
Age: 64
End: 2024-08-05

## 2024-08-05 LAB — CYTOLOGY CVX/VAG DOC THIN PREP: ABNORMAL

## 2024-08-12 ENCOUNTER — APPOINTMENT (OUTPATIENT)
Dept: OPHTHALMOLOGY | Facility: CLINIC | Age: 64
End: 2024-08-12

## 2024-09-26 ENCOUNTER — TRANSCRIPTION ENCOUNTER (OUTPATIENT)
Age: 64
End: 2024-09-26

## 2024-09-30 ENCOUNTER — APPOINTMENT (OUTPATIENT)
Dept: INTERNAL MEDICINE | Facility: CLINIC | Age: 64
End: 2024-09-30
Payer: COMMERCIAL

## 2024-09-30 VITALS
RESPIRATION RATE: 18 BRPM | HEART RATE: 99 BPM | HEIGHT: 63 IN | WEIGHT: 131.39 LBS | SYSTOLIC BLOOD PRESSURE: 110 MMHG | TEMPERATURE: 97.4 F | DIASTOLIC BLOOD PRESSURE: 67 MMHG | OXYGEN SATURATION: 97 % | BODY MASS INDEX: 23.28 KG/M2

## 2024-09-30 DIAGNOSIS — M25.562 PAIN IN LEFT KNEE: ICD-10-CM

## 2024-09-30 DIAGNOSIS — E03.9 HYPOTHYROIDISM, UNSPECIFIED: ICD-10-CM

## 2024-09-30 DIAGNOSIS — Z98.890 OTHER SPECIFIED POSTPROCEDURAL STATES: ICD-10-CM

## 2024-09-30 DIAGNOSIS — M11.20 OTHER CHONDROCALCINOSIS, UNSPECIFIED SITE: ICD-10-CM

## 2024-09-30 DIAGNOSIS — E78.5 HYPERLIPIDEMIA, UNSPECIFIED: ICD-10-CM

## 2024-09-30 PROCEDURE — 99213 OFFICE O/P EST LOW 20 MIN: CPT

## 2024-09-30 PROCEDURE — G2211 COMPLEX E/M VISIT ADD ON: CPT

## 2024-09-30 NOTE — PHYSICAL EXAM
[No Acute Distress] : no acute distress [Normal Sclera/Conjunctiva] : normal sclera/conjunctiva [EOMI] : extraocular movements intact [Normal Rate] : normal rate  [Normal] : affect was normal and insight and judgment were intact [de-identified] : left knee surgical site well healed, c/d/i

## 2024-09-30 NOTE — HISTORY OF PRESENT ILLNESS
[de-identified] : s/p left TKR 8/26/24 at [facility] - doing well since surgery - started PT this past week - post op pain was treated w/ meloxicam and tramadol

## 2024-10-07 ENCOUNTER — RX RENEWAL (OUTPATIENT)
Age: 64
End: 2024-10-07

## 2024-11-12 ENCOUNTER — NON-APPOINTMENT (OUTPATIENT)
Age: 64
End: 2024-11-12

## 2024-11-12 ENCOUNTER — APPOINTMENT (OUTPATIENT)
Dept: INTERNAL MEDICINE | Facility: CLINIC | Age: 64
End: 2024-11-12
Payer: COMMERCIAL

## 2024-11-12 VITALS
WEIGHT: 128 LBS | HEIGHT: 63 IN | DIASTOLIC BLOOD PRESSURE: 70 MMHG | BODY MASS INDEX: 22.68 KG/M2 | SYSTOLIC BLOOD PRESSURE: 119 MMHG | OXYGEN SATURATION: 97 % | HEART RATE: 92 BPM | TEMPERATURE: 98.1 F

## 2024-11-12 DIAGNOSIS — Z98.890 OTHER SPECIFIED POSTPROCEDURAL STATES: ICD-10-CM

## 2024-11-12 DIAGNOSIS — M11.20 OTHER CHONDROCALCINOSIS, UNSPECIFIED SITE: ICD-10-CM

## 2024-11-12 DIAGNOSIS — R10.11 RIGHT UPPER QUADRANT PAIN: ICD-10-CM

## 2024-11-12 DIAGNOSIS — E03.9 HYPOTHYROIDISM, UNSPECIFIED: ICD-10-CM

## 2024-11-12 DIAGNOSIS — E78.5 HYPERLIPIDEMIA, UNSPECIFIED: ICD-10-CM

## 2024-11-12 DIAGNOSIS — Z00.00 ENCOUNTER FOR GENERAL ADULT MEDICAL EXAMINATION W/OUT ABNORMAL FINDINGS: ICD-10-CM

## 2024-11-12 DIAGNOSIS — G89.29 RIGHT UPPER QUADRANT PAIN: ICD-10-CM

## 2024-11-12 DIAGNOSIS — Z87.19 PERSONAL HISTORY OF OTHER DISEASES OF THE DIGESTIVE SYSTEM: ICD-10-CM

## 2024-11-12 PROCEDURE — 93000 ELECTROCARDIOGRAM COMPLETE: CPT

## 2024-11-12 PROCEDURE — 99396 PREV VISIT EST AGE 40-64: CPT

## 2024-11-12 RX ORDER — FOLIC ACID/VIT B COMPLEX AND C 400 MCG
TABLET ORAL DAILY
Refills: 0 | Status: ACTIVE | COMMUNITY
Start: 2024-11-12

## 2024-11-12 RX ORDER — COLD-HOT PACK
125 MCG EACH MISCELLANEOUS
Qty: 90 | Refills: 3 | Status: ACTIVE | COMMUNITY
Start: 2024-11-12

## 2024-11-25 ENCOUNTER — APPOINTMENT (OUTPATIENT)
Dept: ULTRASOUND IMAGING | Facility: CLINIC | Age: 64
End: 2024-11-25
Payer: COMMERCIAL

## 2024-11-25 ENCOUNTER — APPOINTMENT (OUTPATIENT)
Dept: RADIOLOGY | Facility: CLINIC | Age: 64
End: 2024-11-25
Payer: COMMERCIAL

## 2024-11-25 ENCOUNTER — OUTPATIENT (OUTPATIENT)
Dept: OUTPATIENT SERVICES | Facility: HOSPITAL | Age: 64
LOS: 1 days | End: 2024-11-25

## 2024-11-25 PROCEDURE — 77085 DXA BONE DENSITY AXL VRT FX: CPT | Mod: 26

## 2024-11-25 PROCEDURE — 76700 US EXAM ABDOM COMPLETE: CPT | Mod: 26

## 2024-11-25 PROCEDURE — 76536 US EXAM OF HEAD AND NECK: CPT | Mod: 26

## 2024-11-26 ENCOUNTER — TRANSCRIPTION ENCOUNTER (OUTPATIENT)
Age: 64
End: 2024-11-26

## 2024-11-26 DIAGNOSIS — M81.0 AGE-RELATED OSTEOPOROSIS W/OUT CURRENT PATHOLOGICAL FRACTURE: ICD-10-CM

## 2024-11-26 DIAGNOSIS — Z87.39 PERSONAL HISTORY OF OTHER DISEASES OF THE MUSCULOSKELETAL SYSTEM AND CONNECTIVE TISSUE: ICD-10-CM

## 2024-11-26 DIAGNOSIS — E04.1 NONTOXIC SINGLE THYROID NODULE: ICD-10-CM

## 2024-12-23 ENCOUNTER — APPOINTMENT (OUTPATIENT)
Dept: OPHTHALMOLOGY | Facility: CLINIC | Age: 64
End: 2024-12-23
Payer: COMMERCIAL

## 2024-12-23 ENCOUNTER — NON-APPOINTMENT (OUTPATIENT)
Age: 64
End: 2024-12-23

## 2024-12-23 PROCEDURE — 92014 COMPRE OPH EXAM EST PT 1/>: CPT

## 2024-12-23 PROCEDURE — 92134 CPTRZ OPH DX IMG PST SGM RTA: CPT

## 2024-12-30 ENCOUNTER — TRANSCRIPTION ENCOUNTER (OUTPATIENT)
Age: 64
End: 2024-12-30

## 2024-12-31 ENCOUNTER — TRANSCRIPTION ENCOUNTER (OUTPATIENT)
Age: 64
End: 2024-12-31

## 2025-02-25 ENCOUNTER — APPOINTMENT (OUTPATIENT)
Dept: ENDOCRINOLOGY | Facility: CLINIC | Age: 65
End: 2025-02-25

## 2025-04-02 DIAGNOSIS — Z01.84 ENCOUNTER FOR ANTIBODY RESPONSE EXAMINATION: ICD-10-CM

## 2025-04-21 ENCOUNTER — TRANSCRIPTION ENCOUNTER (OUTPATIENT)
Age: 65
End: 2025-04-21

## 2025-07-24 ENCOUNTER — APPOINTMENT (OUTPATIENT)
Dept: GYNECOLOGIC ONCOLOGY | Facility: CLINIC | Age: 65
End: 2025-07-24
Payer: COMMERCIAL

## 2025-07-24 ENCOUNTER — NON-APPOINTMENT (OUTPATIENT)
Age: 65
End: 2025-07-24

## 2025-07-24 VITALS
TEMPERATURE: 97.3 F | DIASTOLIC BLOOD PRESSURE: 74 MMHG | HEIGHT: 63 IN | SYSTOLIC BLOOD PRESSURE: 116 MMHG | HEART RATE: 88 BPM | OXYGEN SATURATION: 95 % | WEIGHT: 130 LBS | BODY MASS INDEX: 23.04 KG/M2

## 2025-07-24 DIAGNOSIS — N95.0 POSTMENOPAUSAL BLEEDING: ICD-10-CM

## 2025-07-24 DIAGNOSIS — N84.0 POLYP OF CORPUS UTERI: ICD-10-CM

## 2025-07-24 PROCEDURE — 99213 OFFICE O/P EST LOW 20 MIN: CPT

## 2025-07-24 PROCEDURE — 99459 PELVIC EXAMINATION: CPT

## 2025-07-28 LAB — HPV HIGH+LOW RISK DNA PNL CVX: NOT DETECTED

## 2025-07-29 ENCOUNTER — TRANSCRIPTION ENCOUNTER (OUTPATIENT)
Age: 65
End: 2025-07-29

## 2025-07-29 LAB — CYTOLOGY CVX/VAG DOC THIN PREP: ABNORMAL

## 2025-07-30 ENCOUNTER — TRANSCRIPTION ENCOUNTER (OUTPATIENT)
Age: 65
End: 2025-07-30

## 2025-08-01 ENCOUNTER — APPOINTMENT (OUTPATIENT)
Dept: GYNECOLOGIC ONCOLOGY | Facility: CLINIC | Age: 65
End: 2025-08-01

## 2025-08-22 ENCOUNTER — APPOINTMENT (OUTPATIENT)
Dept: ULTRASOUND IMAGING | Facility: CLINIC | Age: 65
End: 2025-08-22
Payer: COMMERCIAL

## 2025-08-22 ENCOUNTER — APPOINTMENT (OUTPATIENT)
Dept: MAMMOGRAPHY | Facility: CLINIC | Age: 65
End: 2025-08-22
Payer: COMMERCIAL

## 2025-08-22 ENCOUNTER — RESULT REVIEW (OUTPATIENT)
Age: 65
End: 2025-08-22

## 2025-08-22 PROCEDURE — 76641 ULTRASOUND BREAST COMPLETE: CPT | Mod: 50

## 2025-08-22 PROCEDURE — 76856 US EXAM PELVIC COMPLETE: CPT

## 2025-08-22 PROCEDURE — 76830 TRANSVAGINAL US NON-OB: CPT

## 2025-08-22 PROCEDURE — 77067 SCR MAMMO BI INCL CAD: CPT

## 2025-08-22 PROCEDURE — 77063 BREAST TOMOSYNTHESIS BI: CPT

## 2025-08-25 ENCOUNTER — TRANSCRIPTION ENCOUNTER (OUTPATIENT)
Age: 65
End: 2025-08-25